# Patient Record
Sex: FEMALE | Race: WHITE | URBAN - METROPOLITAN AREA
[De-identification: names, ages, dates, MRNs, and addresses within clinical notes are randomized per-mention and may not be internally consistent; named-entity substitution may affect disease eponyms.]

---

## 2017-01-12 ENCOUNTER — OFFICE VISIT (OUTPATIENT)
Dept: DERMATOLOGY | Facility: CLINIC | Age: 57
End: 2017-01-12

## 2017-01-12 DIAGNOSIS — C44.729 SQUAMOUS CELL CARCINOMA OF SKIN OF LEFT LOWER EXTREMITY: Primary | ICD-10-CM

## 2017-01-12 PROCEDURE — 88305 TISSUE EXAM BY PATHOLOGIST: CPT | Performed by: DERMATOLOGY

## 2017-01-12 RX ORDER — LIDOCAINE HYDROCHLORIDE AND EPINEPHRINE 10; 10 MG/ML; UG/ML
3 INJECTION, SOLUTION INFILTRATION; PERINEURAL ONCE
Qty: 3 ML | Refills: 0 | OUTPATIENT
Start: 2017-01-12 | End: 2017-01-12

## 2017-01-12 ASSESSMENT — PAIN SCALES - GENERAL
PAINLEVEL: SEVERE PAIN (6)
PAINLEVEL: NO PAIN (0)

## 2017-01-12 NOTE — PROGRESS NOTES
DERMATOLOGIC SURGERY REPORT    NAME OF PROCEDURE:  EXCISION AND INTERMEDIATE CLOSURE    Surgeon:  Alondra Caceres    PREOPERATIVE DIAGNOSIS:  Squamous cell carcinoma  POSTOPERATIVE DIAGNOSIS: Same  FINAL EXCISION SIZE:  2.2 cm x 2.2 cm (including 4 mm margin)  FINAL REPAIR LENGTH:  5.4 cm    INDICATIONS:  This patient presented with a 1.4 cm x 1.4 cm SCC on the L lower leg.  Excision was indicated.  We discussed the principles of treatment and most likely complications including bleeding, infection, wound dehiscence, pain, nerve damage, and scarring.  Informed consent was obtained and the patient underwent the procedure as follows.    PROCEDURE:  The patient was taken to the operative suite.  The treatment area was anesthetized with 1% lidocaine and epinephrine mixed 1:1,000 with 0.5% Marcaine.  The area was washed with Hibiclens, rinsed with saline and draped with sterile towels.  The lesion was delineated and excised down to subcutaneous fat.  Hemostasis was obtained by electrocoagulation.  With a margin of 4 mm, the final excision size was 2.2 cm x 2.2 cm.      REPAIR:  In order to repair this defect while maintaining the normal anatomic relations and function, we elected to utilize an intermediate linear closure.  Closure was oriented so that the wound was in the patient's natural skin tension lines.  Deeper layers of the subcutaneous tissue were undermined first.  Deep dermal and subcutaneous layer closure was performed using 4-0 Vicryl deep, intradermal and subcutaneous sutures.  Two redundant columns were removed by triangulation.  Final cutaneous approximation was achieved with 4-0 Prolene simple running sutures.     The final wound length was 5.4 cm.  A total of 10 ml of anesthesia was administered for all surgical sites.  Estimated blood loss was less than 1 ml for all surgical sites.  A sterile pressure dressing was applied and wound care instructions, with a written handout, were given.  The patient was  discharged from the [facility] alert and ambulatory.

## 2017-01-12 NOTE — Clinical Note
1/12/2017       RE: Nanci Van  [address] [facility] [address]     Dear Colleague,    Thank you for referring your patient, Nanci Van, to the [facility] DERMATOLOGY at [facility]. Please see a copy of my visit note below.    DERMATOLOGIC SURGERY REPORT    NAME OF PROCEDURE:  EXCISION AND INTERMEDIATE CLOSURE    Surgeon:  Alondra Caceres    PREOPERATIVE DIAGNOSIS:  Squamous cell carcinoma  POSTOPERATIVE DIAGNOSIS: Same  FINAL EXCISION SIZE:  5.4 cm x 2.8 cm, with 4 mm margin  FINAL REPAIR LENGTH:  5.4 cm    INDICATIONS:  This patient presented with a 2.0 cm x 2.0 cm SCC on the L lower leg.  Excision was indicated.  We discussed the principles of treatment and most likely complications including bleeding, infection, wound dehiscence, pain, nerve damage, and scarring.  Informed consent was obtained and the patient underwent the procedure as follows.    PROCEDURE:  The patient was taken to the operative suite.  The treatment area was anesthetized with 1% lidocaine and epinephrine mixed 1:1,000 with 0.5% Marcaine.  The area was washed with Hibiclens, rinsed with saline and draped with sterile towels.  The lesion was delineated and excised down to subcutaneous fat.  Hemostasis was obtained by electrocoagulation.  With a margin of 4 mm, the final excision size was 2.8 cm x 5.4 cm.      REPAIR:  In order to repair this defect while maintaining the normal anatomic relations and function, we elected to utilize an intermediate linear closure.  Closure was oriented so that the wound was in the patient's natural skin tension lines.  Deeper layers of the subcutaneous tissue were undermined first.  Deep dermal and subcutaneous layer closure was performed using 4-0 Vicryl deep, intradermal and subcutaneous sutures.  Two redundant columns were removed by triangulation.  Final cutaneous approximation was achieved with 4-0 Prolene simple running sutures.     The final wound  length was 5.4 cm.  A total of 10 ml of anesthesia was administered for all surgical sites.  Estimated blood loss was less than 1 ml for all surgical sites.  A sterile pressure dressing was applied and wound care instructions, with a written handout, were given.  The patient was discharged from the [facility] alert and ambulatory    Again, thank you for allowing me to participate in the care of your patient.      Sincerely,    Kit Caceres MD

## 2017-01-12 NOTE — NURSING NOTE
Dermatology Rooming Note    Nanci Van's goals for this visit include:   Chief Complaint   Patient presents with     Derm Problem     Nanci comes to clinic for excision of SCC.     Vashti Lewis LPN

## 2017-01-12 NOTE — MR AVS SNAPSHOT
After Visit Summary   1/12/2017    Nanci Van    MRN: [id number]           Patient Information     Date Of Birth          1960        Visit Information        Provider Department      1/12/2017 8:15 AM Kit Caceres MD [facility] Dermatology        Today's Diagnoses     Squamous cell carcinoma of skin of left lower extremity    -  1       Care Instructions    Excision Wound Care Instructions  I will experience scar, altered skin color, bleeding, swelling, pain, crusting and redness. I may experience altered sensation. Risks are excessive bleeding, infection, muscle weakness, thick (hypertrophic or keloidal) scar, and recurrence,. A second procedure may be recommended to obtain the best cosmetic or functional result.  Possible complications of any surgical procedure are bleeding, infection, scarring, alteration in skin color and sensation, muscle weakness in the area, wound dehiscence or seperation, or recurrence of the lesion or disease. On occasion, after healing, a secondary procedure or revision may be recommended in order to obtain the best cosmetic or functional result.   After your surgery, a pressure bandage will be placed over the area that has sutures. This will help prevent bleeding. Please follow these instructions until you come back to clinic for suture removal in 2 weeks, as they will help you to prevent complications as your wound heals.  For the First 48 hours After Surgery:  1. Leave the pressure bandage on and keep it dry. If it should come loose, you may retape it, but do not take it off.  2. Relax and take it easy. Do not do any vigorous exercise, heavy lifting, or bending forward. This could cause the wound to bleed.  3. Post-operative pain is usually mild. You may take plain or extra strength Tylenol every 4 hours as needed (do not take more than 4,000mg in one day). Do not take any medicine that contains aspirin, ibuprofen or motrin unless you have been recommended  these by a doctor.  Avoid alcohol and vitamin E as these may increase your tendency to bleed.  4. You may put an ice pack around the bandaged area for 20 minutes every 2-3 hours. This may help reduce swelling, bruising, and pain. Make sure the ice pack is waterproof so that the pressure bandage does not get wet.   5. You may see a small amount of drainage or blood on your pressure bandage. This is normal. However, if drainage or bleeding continues or saturates the bandage, you will need to apply firm pressure over the bandage with a washcloth for 15 minutes. If bleeding continues after applying pressure for 15 minutes then go to the nearest emergency room.  48 Hours After Surgery  Carefully remove the bandage and start daily wound care and dressing changes. You may also now shower and get the wound wet. Wash wound with a mild soap and water.  Use caution when washing the wound. Be gentle and do not let the forceful shower stream hit the wound directly.  PAT dry.  Daily Wound Care:  1. Wash wound with a mild soap and water.  Use caution when washing the wound, be gentle and do not let the forceful shower stream hit the wound directly.  2. PAT DRY.  3. Apply Vaseline (from a new container or tube) over the suture line with a Q-tip. It is very important to keep the wound continuously moist, as wounds heal best in a moist environment.  4.  Keep the site covered until sutures are removed, you can cover it with a Telfa (non-stick) dressing and tape or a band-aid.    5. If you are unable to keep wound covered, you must apply Vaseline every 2 - 3 hours (while awake) to ensure it is being kept moist for optimal healing. A dressing overnight is recommended to keep the area moist.   Call Us If:  1. You have pain that is not controlled with Tylenol.  2. You have signs or symptoms of an infection, such as: fever over 100 degrees F, redness, warmth, or foul-smelling or yellow/creamy drainage from the wound.  Who should I call  with questions?    [facility]: [phone]     [facility]: [phone]    For urgent needs outside of business hours call the [facility] at [phone] and ask for the dermatology resident on call          Follow-ups after your visit        Future tests that were ordered for you today     Open Future Orders        Priority Expected Expires Ordered    Surgical pathology exam Routine  2018            Who to contact     Please call your clinic at [phone] to:    Ask questions about your health    Make or cancel appointments    Discuss your medicines    Learn about your test results    Speak to your doctor   If you have compliments or concerns about an experience at your clinic, or if you wish to file a complaint, please contact [facility] Patient Relations at [phone] or email us at [email or web address]         Additional Information About Your Visit        WatchGuardharSAW Instrument Information     CoursePeert is an electronic gateway that provides easy, online access to your medical records. With SolAeroMed, you can request a clinic appointment, read your test results, renew a prescription or communicate with your care team.     To sign up for CoursePeert visit the website at [email or web address]   You will be asked to enter the access code listed below, as well as some personal information. Please follow the directions to create your username and password.     Your access code is: [email or web address]  Expires: 2/15/2017 11:38 AM     Your access code will  in 90 days. If you need help or a new code, please contact your [facility] or call [phone] for assistance.        Care EveryWhere ID     This is your Care EveryWhere ID. This could be used by other organizations to access your [facility] medical records  [id number]         Blood Pressure from Last 3 Encounters:    06/15/16 170/81   08/07/14 136/76   01/24/14 111/71    Weight from Last 3 Encounters:   11/29/12 77.565 kg (171 lb)   01/12/10 76.885 kg (169 lb 8 oz)   10/20/08 78.926 kg (174 lb)               Primary Care Provider Office Phone # Fax #    Keturah Mckeon [phone]       [facility] [address]  [facility] [place] [address]        Thank you!     Thank you for choosing [facility] DERMATOLOGY  for your care. Our goal is always to provide you with excellent care. Hearing back from our patients is one way we can continue to improve our services. Please take a few minutes to complete the written survey that you may receive in the mail after your visit with us. Thank you!             Your Updated Medication List - Protect others around you: Learn how to safely use, store and throw away your medicines at www.disposemymeds.org.          This list is accurate as of: 1/12/17  9:30 AM.  Always use your most recent med list.                   Brand Name Dispense Instructions for use    ADDERALL PO      Take 40 mg by mouth daily       B COMPLEX-C-FOLIC ACID PO          calcitRIOL 0.25 MCG capsule    ROCALTROL     Take 0.25 mcg by mouth daily       EFFEXOR XR PO      Take  by mouth. 300 mg       furosemide 20 MG tablet    LASIX     Take 20 mg by mouth daily Take 20 mg twice daily       HYDROcodone-acetaminophen 5-325 MG per tablet    NORCO    28 tablet    Take 1 tablet by mouth every 6 hours as needed for moderate to severe pain       insulin detemir 100 UNIT/ML injection    LEVEMIR     Inject Subcutaneous At Bedtime       liraglutide 18 MG/3ML soln    VICTOZA     Inject Subcutaneous daily       mupirocin 2 % ointment    BACTROBAN    22 g    Use 1 to 2 times a day to affected area.       predniSONE 5 MG tablet    DELTASONE    0    1 TABLET DAILY       PROTONIX 40 MG EC tablet   Generic drug:  pantoprazole     30    1 TABLET DAILY       PROZAC PO      Take 40 mg by mouth       SIMVASTATIN PO           sirolimus Tabs tablet     30 tablet    Take 1 tablet by mouth daily.       TEMAZEPAM PO      15 mg.       traZODone 50 MG tablet    DESYREL    60    1-2 pills at night as needed       UNKNOWN MED DOSAGE      A renal vitamin       VISTARIL PO          VITAMIN D PO

## 2017-01-12 NOTE — PATIENT INSTRUCTIONS

## 2017-01-16 LAB — COPATH REPORT: NORMAL

## 2017-01-18 ENCOUNTER — TELEPHONE (OUTPATIENT)
Dept: DERMATOLOGY | Facility: CLINIC | Age: 57
End: 2017-01-18

## 2017-01-18 NOTE — TELEPHONE ENCOUNTER
Called patient with results of excision.  Skin cancer completely removed.  Patient notes she is healing well but having a lot of tugging sensation at the stitches.  I discussed with her that this is a sign that she is doing too much. We reviewed that the lower leg is very slow to heal and requires special care. Explained that she should keep the leg elevated as much as possible to prevent swelling.  She asked if she could have stitches removed closer to home.  I told her this is fine, but should wait for between 2-3 weeks after surgery prior to removal.      Rima Poole MD  PGY-3, Dermatology  Pager [phone]

## 2017-01-24 ENCOUNTER — TELEPHONE (OUTPATIENT)
Dept: DERMATOLOGY | Facility: CLINIC | Age: 57
End: 2017-01-24

## 2017-01-24 NOTE — TELEPHONE ENCOUNTER
"Returned patients call regarding possible infection at suture site on lower left leg. Patient states area is \"more red\" than previous. Sutures intact. Pain 3/10. Denies fever, swelling or drainage. States she noted possible odor from the site. Patient has been using \"golbs\" of neosporin to the site and covering with bandage. Advised patient to d/c neosporin as it can cause unwanted irritation/reaction. Advised applying thin layer of vaseline and cover as previously instructed. Patient was planning on removing sutures herself but would now like to be seen to have them removed. Patient scheduled on 1/26/17 at 7:45 with Dr Caceres. Patient to contact clinic if are worsens before appt.    "

## 2017-01-26 ENCOUNTER — OFFICE VISIT (OUTPATIENT)
Dept: DERMATOLOGY | Facility: CLINIC | Age: 57
End: 2017-01-26

## 2017-01-26 DIAGNOSIS — Z85.828 HISTORY OF SQUAMOUS CELL CARCINOMA OF SKIN: ICD-10-CM

## 2017-01-26 PROCEDURE — 87077 CULTURE AEROBIC IDENTIFY: CPT | Performed by: DERMATOLOGY

## 2017-01-26 PROCEDURE — 87070 CULTURE OTHR SPECIMN AEROBIC: CPT | Performed by: DERMATOLOGY

## 2017-01-26 PROCEDURE — 87186 SC STD MICRODIL/AGAR DIL: CPT | Performed by: DERMATOLOGY

## 2017-01-26 RX ORDER — CEPHALEXIN 500 MG/1
500 CAPSULE ORAL 2 TIMES DAILY
Qty: 20 CAPSULE | Refills: 0 | Status: SHIPPED | OUTPATIENT
Start: 2017-01-26 | End: 2017-02-13

## 2017-01-26 ASSESSMENT — PAIN SCALES - GENERAL: PAINLEVEL: SEVERE PAIN (6)

## 2017-01-26 NOTE — MR AVS SNAPSHOT
After Visit Summary   1/26/2017    Nanci Van    MRN: [id number]           Patient Information     Date Of Birth          1960        Visit Information        Provider Department      1/26/2017 7:45 AM Kit Caceres MD [facility] Dermatology        Today's Diagnoses     Postoperative wound infection, initial encounter    -  1     History of squamous cell carcinoma of skin            Follow-ups after your visit        Your next 10 appointments already scheduled     Jan 26, 2017  7:45 AM   (Arrive by 7:30 AM)   Return Visit with Kit Caceres MD   [facility] Dermatology [facility])    [address] [place] [id number] [phone]            Feb 02, 2017 11:15 AM   Nurse Visit with  Dermatology Nurse   [facility])    [address] [place] [id number] [phone]            Feb 23, 2017  9:30 AM   (Arrive by 9:15 AM)   Return Visit with Kit Caceres MD   [facility] Dermatology ([facility])    [address] [place] [id number] [phone]              Future tests that were ordered for you today     Open Future Orders        Priority Expected Expires Ordered    Skin Culture Aerobic Bacterial Routine  1/26/2018 1/26/2017            Who to contact     Please call your clinic at [phone] to:    Ask questions about your health    Make or cancel appointments    Discuss your medicines    Learn about your test results    Speak to your doctor   If you have compliments or concerns about an experience at your clinic, or if you wish to file a complaint, please contact [facility] Patient Relations at [phone] or email us at [email or web address]         Additional Information About Your Visit        Care EveryWhere ID     This is your Care EveryWhere ID. This could be used by  other organizations to access your [facility] medical records  [id number]         Blood Pressure from Last 3 Encounters:   06/15/16 170/81   08/07/14 136/76   01/24/14 111/71    Weight from Last 3 Encounters:   11/29/12 77.565 kg (171 lb)   01/12/10 76.885 kg (169 lb 8 oz)   10/20/08 78.926 kg (174 lb)                 Today's Medication Changes          These changes are accurate as of: 1/26/17  7:39 AM.  If you have any questions, ask your nurse or doctor.               Start taking these medicines.        Dose/Directions    cephALEXin 500 MG capsule   Commonly known as:  KEFLEX   Used for:  History of squamous cell carcinoma of skin   Started by:  Kit Caceres MD        Dose:  500 mg   Take 1 capsule (500 mg) by mouth 2 times daily   Quantity:  20 capsule   Refills:  0            Where to get your medicines      These medications were sent to [address] [address]    Hours:  TRANSPLANT PHONE NUMBER [phone] Phone:  [phone]    - cephALEXin 500 MG capsule             Primary Care Provider Office Phone # Fax #    Keturah Mckeon [phone]       [facility] [address]  [facility] [address]        Thank you!     Thank you for choosing [facility] DERMATOLOGY  for your care. Our goal is always to provide you with excellent care. Hearing back from our patients is one way we can continue to improve our services. Please take a few minutes to complete the written survey that you may receive in the mail after your visit with us. Thank you!             Your Updated Medication List - Protect others around you: Learn how to safely use, store and throw away your medicines at www.disposemymeds.org.          This list is accurate as of: 1/26/17  7:39 AM.  Always use your most recent med list.                   Brand Name Dispense Instructions for use    ADDERALL PO       Take 40 mg by mouth daily       B COMPLEX-C-FOLIC ACID PO          calcitRIOL 0.25 MCG capsule    ROCALTROL     Take 0.25 mcg by mouth daily       cephALEXin 500 MG capsule    KEFLEX    20 capsule    Take 1 capsule (500 mg) by mouth 2 times daily       EFFEXOR XR PO      Take  by mouth. 300 mg       furosemide 20 MG tablet    LASIX     Take 20 mg by mouth daily Take 20 mg twice daily       HYDROcodone-acetaminophen 5-325 MG per tablet    NORCO    28 tablet    Take 1 tablet by mouth every 6 hours as needed for moderate to severe pain       insulin detemir 100 UNIT/ML injection    LEVEMIR     Inject Subcutaneous At Bedtime       liraglutide 18 MG/3ML soln    VICTOZA     Inject Subcutaneous daily       mupirocin 2 % ointment    BACTROBAN    22 g    Use 1 to 2 times a day to affected area.       predniSONE 5 MG tablet    DELTASONE    0    1 TABLET DAILY       PROTONIX 40 MG EC tablet   Generic drug:  pantoprazole     30    1 TABLET DAILY       PROZAC PO      Take 40 mg by mouth       SIMVASTATIN PO          sirolimus Tabs tablet     30 tablet    Take 1 tablet by mouth daily.       TEMAZEPAM PO      15 mg.       traZODone 50 MG tablet    DESYREL    60    1-2 pills at night as needed       UNKNOWN MED DOSAGE      A renal vitamin       VISTARIL PO          VITAMIN D PO

## 2017-01-26 NOTE — NURSING NOTE
Dermatology Rooming Note    Nanci Van's goals for this visit include:   Chief Complaint   Patient presents with     Derm Problem     Possible infection of excision site.     Noy Mckeon, CMA

## 2017-01-26 NOTE — Clinical Note
1/26/2017       RE: Nanci Van  [address]   [facility] [address]     Dear Colleague,    Thank you for referring your patient, Nanci Van, to the [facility] DERMATOLOGY at [facility]. Please see a copy of my visit note below.    SUBJECTIVE:  56F returning for 2 week follow-up, reporting redness and pain at surgery site for SCC 2 weeks ago.     OBJECTIVE:  A healthy lady in no distress.  Present on her left lower leg is a red-rimmed ~5cm linear wound with friable edges, intact running and interrupted prolene sutures, mild tenderness, minimal inducible purlence.     MEDICATIONS:  Reviewed.      ALLERGIES:  Reviewed.     ASSESSMENT:  Postoperative woudn infection     PLAN:   -empiric RX cephalexin 500 mg po BID, which she has tolerated well in the past. Side effects reviewed.  -swab for bacterial culture will call with results, plan for abx transition    RTC 1 weeks (nurse visit for suture removal)    Discussed and examined with [facility] staff dermatologist Dr. JAMES Caceres MD, JACINTA  PGY-4, Dermatology

## 2017-01-28 ENCOUNTER — TELEPHONE (OUTPATIENT)
Dept: NEPHROLOGY | Facility: CLINIC | Age: 57
End: 2017-01-28

## 2017-01-28 LAB
BACTERIA SPEC CULT: ABNORMAL
Lab: ABNORMAL
MICRO REPORT STATUS: ABNORMAL
MICROORGANISM SPEC CULT: ABNORMAL
SPECIMEN SOURCE: ABNORMAL

## 2017-01-28 NOTE — TELEPHONE ENCOUNTER
Medication Appeal Initiation    We have initiated an appeal for the requested medication:  Medication: RAPAMUNE 1 MG tablet   Appeal Start Date:  1/28/2017  Insurance Company:  [employer] PHONE#[phone]  Comments:   MANUALLY FAXED APPEAL TO FAX#[phone]    TRANSPLANT DATE:11/02/1987  MEDICARE PART-A EFFECTIVE DATE:11/01/2014

## 2017-01-28 NOTE — TELEPHONE ENCOUNTER
[facility] Prior Authorization Team   Phone: [phone]  Fax: [phone]      PA Initiation    Medication: RAPAMUNE 1 MG tablet   Insurance Company: Carol - Phone [phone] Fax [phone]  Pharmacy Filling the Rx: [facility] - [place], [place] - [address]  Filling Pharmacy Phone: [phone]  Filling Pharmacy Fax: [phone]  Start Date: 1/28/2017

## 2017-01-28 NOTE — TELEPHONE ENCOUNTER
PRIOR AUTHORIZATION DENIED    Medication: RAPAMUNE 1 MG tablet     Denial Date: 1/28/2017    Denial Rational: [employer] STATES COVERAGE IS AVAILABLE UNDER PART-B HOWEVER [employer] PART-D HAS PREVIOUSLY APPROVED COVERAGE - FOR PEER TO PEER REVIEW OF ADVERSE DETERMINATION - CONTACT COVERAGE DETERMINATION CENTER PHONE#[phone]     Appeal Information: SUBMIT APPEAL VIA FAX#[phone] OR PHONE#[phone]

## 2017-01-29 ENCOUNTER — TELEPHONE (OUTPATIENT)
Dept: DERMATOLOGY | Facility: CLINIC | Age: 57
End: 2017-01-29

## 2017-01-29 NOTE — TELEPHONE ENCOUNTER
Reached Ms giordano by phone with Cx result showing MSSA. Advised continuing cephalexin as planned, follow-up as planned.    Kit Caceres MD, JACINTA  PGY-4, Dermatology

## 2017-02-02 ENCOUNTER — ALLIED HEALTH/NURSE VISIT (OUTPATIENT)
Dept: DERMATOLOGY | Facility: CLINIC | Age: 57
End: 2017-02-02

## 2017-02-02 DIAGNOSIS — Z48.02 VISIT FOR SUTURE REMOVAL: Primary | ICD-10-CM

## 2017-02-02 RX ORDER — LIDOCAINE HYDROCHLORIDE AND EPINEPHRINE 10; 10 MG/ML; UG/ML
3 INJECTION, SOLUTION INFILTRATION; PERINEURAL ONCE
Qty: 3 ML | Refills: 0 | OUTPATIENT
Start: 2017-02-02 | End: 2017-02-02

## 2017-02-02 NOTE — NURSING NOTE
Removed running sutures, from leg without complication.  Patient tolerated procedure well, and understood all followup instructions.

## 2017-02-09 NOTE — PROGRESS NOTES
I talked with and examined Nanci Van and I agree with the assessment and the plan. ROSINA Cantu MD.

## 2017-02-13 ENCOUNTER — TELEPHONE (OUTPATIENT)
Dept: DERMATOLOGY | Facility: CLINIC | Age: 57
End: 2017-02-13

## 2017-02-13 DIAGNOSIS — Z85.828 HISTORY OF SQUAMOUS CELL CARCINOMA OF SKIN: ICD-10-CM

## 2017-02-13 DIAGNOSIS — T81.49XA WOUND INFECTION FOLLOWING PROCEDURE: Primary | ICD-10-CM

## 2017-02-13 RX ORDER — CEPHALEXIN 500 MG/1
500 CAPSULE ORAL 2 TIMES DAILY
Qty: 20 CAPSULE | Refills: 0 | Status: SHIPPED | OUTPATIENT
Start: 2017-02-13 | End: 2017-05-23

## 2017-02-13 NOTE — TELEPHONE ENCOUNTER
Received refill request for Keflex as the resident on call. Reviewed patient's chart and attached communication. Patient last seen 1/27/17 for post-op wound infection following Mohs on the leg for SCC. Culture grew MSSA; the infection responded mostly but not completely to the Keflex which she tolerated well; 1 week after finishing the course, she started having redness, green drainage, and pain again. After reviewing the assessment and plan from last visit, and discussing with Dr. Driver, dermatologist on call, the refill request was accepted. She has f/u with Dr. Caceres next week.      Martell Ledezma MD  Dermatology Resident

## 2017-02-13 NOTE — TELEPHONE ENCOUNTER
Patient states her home care nurse states she needed to contact clinic due to the look of her wound.    Patient reports Pain 5/10, weeping green/gray drainage, possible odor, warmth, redness, denies fever. Patient states she is a kidney patient and was told by Dr Caceres to call if she felt she had another infection and antibiotic would be ordered.    Pt uses [facility] [address] [place] [address] [phone]

## 2017-03-30 ENCOUNTER — OFFICE VISIT (OUTPATIENT)
Dept: DERMATOLOGY | Facility: CLINIC | Age: 57
End: 2017-03-30

## 2017-03-30 DIAGNOSIS — Z85.828 PERSONAL HISTORY OF SQUAMOUS CELL CARCINOMA OF SKIN: ICD-10-CM

## 2017-03-30 DIAGNOSIS — L57.0 AK (ACTINIC KERATOSIS): ICD-10-CM

## 2017-03-30 DIAGNOSIS — L85.3 XEROSIS CUTIS: ICD-10-CM

## 2017-03-30 DIAGNOSIS — D48.5 NEOPLASM OF UNCERTAIN BEHAVIOR OF SKIN: Primary | ICD-10-CM

## 2017-03-30 DIAGNOSIS — L82.1 STUCCO KERATOSES: ICD-10-CM

## 2017-03-30 PROCEDURE — 88305 TISSUE EXAM BY PATHOLOGIST: CPT | Performed by: DERMATOLOGY

## 2017-03-30 RX ORDER — AMMONIUM LACTATE 12 G/100G
LOTION TOPICAL 2 TIMES DAILY
Qty: 500 G | Refills: 11 | Status: SHIPPED | OUTPATIENT
Start: 2017-03-30

## 2017-03-30 ASSESSMENT — PAIN SCALES - GENERAL
PAINLEVEL: MODERATE PAIN (4)
PAINLEVEL: NO PAIN (0)

## 2017-03-30 NOTE — MR AVS SNAPSHOT
After Visit Summary   3/30/2017    Nanci Van    MRN: [id number]           Patient Information     Date Of Birth          1960        Visit Information        Provider Department      3/30/2017 9:30 AM Kit Caceres MD [facility] Dermatology        Today's Diagnoses     Neoplasm of uncertain behavior of skin    -  1    AK (actinic keratosis)        Personal history of squamous cell carcinoma of skin        Stucco keratoses        Xerosis cutis          Care Instructions    Wound Care After a Biopsy    What is a skin biopsy?  A skin biopsy allows the doctor to examine a very small piece of tissue under the microscope to determine the diagnosis and the best treatment for the skin condition. A local anesthetic (numbing medicine)  is injected with a very small needle into the skin area to be tested. A small piece of skin is taken from the area. Sometimes a suture (stitch) is used.     What are the risks of a skin biopsy?  I will experience scar, bleeding, swelling, pain, crusting and redness. I may experience incomplete removal or recurrence. Risks of this procedure are excessive bleeding, bruising, infection, nerve damage, numbness, thick (hypertrophic or keloidal) scar and non-diagnostic biopsy.    How should I care for my wound for the first 24 hours?    Keep the wound dry and covered for 24 hours    If it bleeds, hold direct pressure on the area for 15 minutes. If bleeding does not stop then go to the emergency room    Avoid strenuous exercise the first 1-2 days or as your doctor instructs you    How should I care for the wound after 24 hours?    After 24 hours, remove the bandage    You may bathe or shower as normal    If you had a scalp biopsy, you can shampoo as usual and can use shower water to clean the biopsy site daily    Clean the wound twice a day with gentle soap and water    Do not scrub, be gentle    Apply white petroleum/Vaseline after cleaning the wound with a cotton swab or  a clean finger, and keep the site covered with a Bandaid /bandage. Bandages are not necessary with a scalp biopsy    If you are unable to cover the site with a Bandaid /bandage, re-apply ointment 2-3 times a day to keep the site moist. Moisture will help with healing    Avoid strenuous activity for first 1-2 days    Avoid lakes, rivers, pools, and oceans until the stitches are removed or the site is healed    How do I clean my wound?    Wash hands for 15 minutes with soap or use hand  before all wound care    Clean the wound with gentle soap and water    Apply white petroleum/Vaseline  to wound after it is clean    Replace the Bandaid /bandage to keep the wound covered for the first few days or as instructed by your doctor    If you had a scalp biopsy, warm shower water to the area on a daily basis should suffice    What should I use to clean my wound?     Cotton-tipped applicators (Qtips )    White petroleum jelly (Vaseline ). Use a clean new container and use Q-tips to apply.    Bandaids   as needed    Gentle soap     How should I care for my wound long term?    Do not get your wound dirty    Keep up with wound care for one week or until the area is healed.    A small scab will form and fall off by itself when the area is completely healed. The area will be red and will become pink in color as it heals. Sun protection is very important for how your scar will turn out. Sunscreen with an SPF 30 or greater is recommended once the area is healed.    If you have stitches, stitches need to be removed in 0     days. You may return to our clinic for this or you may have it done locally at your doctor s office.    You should have some soreness but it should be mild and slowly go away over several days. Talk to your doctor about using tylenol for pain,    When should I call my doctor?  If you have increased:     Pain or swelling    Pus or drainage (clear or slightly yellow drainage is ok)    Temperature over  100F    Spreading redness or warmth around wound    When will I hear about my results?  The biopsy results can take 2-3 weeks to come back. The clinic will call you with the results, send you a [employer] message, or have you schedule a follow-up clinic or phone time to discuss the results. Contact our clinics if you do not hear from us in 3 weeks.     Who should I call with questions?    [facility]: [phone]     [facility]: [phone]    For urgent needs outside of business hours call the [facility] at [phone] and ask for the dermatology resident on call            Follow-ups after your visit        Your next 10 appointments already scheduled     Jun 29, 2017 10:45 AM CDT   (Arrive by 10:30 AM)   Return Visit with Kit Caceres MD   [facility] Dermatology ([facility])    [address] [place] [id number] [phone]              Who to contact     Please call your clinic at [phone] to:    Ask questions about your health    Make or cancel appointments    Discuss your medicines    Learn about your test results    Speak to your doctor   If you have compliments or concerns about an experience at your clinic, or if you wish to file a complaint, please contact [facility] Patient Relations at [phone] or email us at [email or web address]         Additional Information About Your Visit        [employer] Information     [employer] gives you secure access to your electronic health record. If you see a primary care provider, you can also send messages to your care team and make appointments. If you have questions, please call your primary care clinic.  If you do not have a primary care provider, please call [phone] and they will assist you.      [employer] is an electronic gateway that provides easy, online access to your medical records. With [employer],  you can request a clinic appointment, read your test results, renew a prescription or communicate with your care team.     To access your existing account, please contact your [facility] or call [phone] for assistance.        Care EveryWhere ID     This is your Care EveryWhere ID. This could be used by other organizations to access your [facility] medical records  [id number]         Blood Pressure from Last 3 Encounters:   06/15/16 170/81   08/07/14 136/76   01/24/14 111/71    Weight from Last 3 Encounters:   11/29/12 77.6 kg (171 lb)   01/12/10 76.9 kg (169 lb 8 oz)   10/20/08 78.9 kg (174 lb)              We Performed the Following     Surgical pathology exam          Today's Medication Changes          These changes are accurate as of: 3/30/17 10:12 AM.  If you have any questions, ask your nurse or doctor.               Start taking these medicines.        Dose/Directions    ammonium lactate 12 % lotion   Commonly known as:  LAC-HYDRIN   Used for:  Xerosis cutis   Started by:  Kit Caceres MD        Apply topically 2 times daily   Quantity:  500 g   Refills:  11            Where to get your medicines      These medications were sent to [place], [place] - [address] [facility]  [address] [facility] [address]     Phone:  [phone]     ammonium lactate 12 % lotion                Primary Care Provider Office Phone # Fax #    Keturah Mckeon [phone]       [facility] [address]  [facility] [address]        Thank you!     Thank you for choosing [facility] DERMATOLOGY  for your care. Our goal is always to provide you with excellent care. Hearing back from our patients is one way we can continue to improve our services. Please take a few minutes to complete the written survey that you may receive in the mail after your visit with us. Thank you!             Your Updated Medication List - Protect others  around you: Learn how to safely use, store and throw away your medicines at www.disposemymeds.org.          This list is accurate as of: 3/30/17 10:12 AM.  Always use your most recent med list.                   Brand Name Dispense Instructions for use    ADDERALL PO      Take 40 mg by mouth daily       ammonium lactate 12 % lotion    LAC-HYDRIN    500 g    Apply topically 2 times daily       B COMPLEX-C-FOLIC ACID PO          calcitRIOL 0.25 MCG capsule    ROCALTROL     Take 0.25 mcg by mouth daily       cephALEXin 500 MG capsule    KEFLEX    20 capsule    Take 1 capsule (500 mg) by mouth 2 times daily       EFFEXOR XR PO      Take  by mouth. 300 mg       furosemide 20 MG tablet    LASIX     Take 20 mg by mouth daily Take 20 mg twice daily       HYDROcodone-acetaminophen 5-325 MG per tablet    NORCO    28 tablet    Take 1 tablet by mouth every 6 hours as needed for moderate to severe pain       insulin detemir 100 UNIT/ML injection    LEVEMIR     Inject Subcutaneous At Bedtime       liraglutide 18 MG/3ML soln    VICTOZA     Inject Subcutaneous daily       mupirocin 2 % ointment    BACTROBAN    22 g    Use 1 to 2 times a day to affected area.       predniSONE 5 MG tablet    DELTASONE    0    1 TABLET DAILY       PROTONIX 40 MG EC tablet   Generic drug:  pantoprazole     30    1 TABLET DAILY       PROZAC PO      Take 40 mg by mouth       SIMVASTATIN PO          sirolimus Tabs tablet     30 tablet    Take 1 tablet by mouth daily.       TEMAZEPAM PO      15 mg.       traZODone 50 MG tablet    DESYREL    60    1-2 pills at night as needed       UNKNOWN MED DOSAGE      A renal vitamin       VISTARIL PO          VITAMIN D PO

## 2017-03-30 NOTE — PATIENT INSTRUCTIONS
Wound Care After a Biopsy    What is a skin biopsy?  A skin biopsy allows the doctor to examine a very small piece of tissue under the microscope to determine the diagnosis and the best treatment for the skin condition. A local anesthetic (numbing medicine)  is injected with a very small needle into the skin area to be tested. A small piece of skin is taken from the area. Sometimes a suture (stitch) is used.     What are the risks of a skin biopsy?  I will experience scar, bleeding, swelling, pain, crusting and redness. I may experience incomplete removal or recurrence. Risks of this procedure are excessive bleeding, bruising, infection, nerve damage, numbness, thick (hypertrophic or keloidal) scar and non-diagnostic biopsy.    How should I care for my wound for the first 24 hours?    Keep the wound dry and covered for 24 hours    If it bleeds, hold direct pressure on the area for 15 minutes. If bleeding does not stop then go to the emergency room    Avoid strenuous exercise the first 1-2 days or as your doctor instructs you    How should I care for the wound after 24 hours?    After 24 hours, remove the bandage    You may bathe or shower as normal    If you had a scalp biopsy, you can shampoo as usual and can use shower water to clean the biopsy site daily    Clean the wound twice a day with gentle soap and water    Do not scrub, be gentle    Apply white petroleum/Vaseline after cleaning the wound with a cotton swab or a clean finger, and keep the site covered with a Bandaid /bandage. Bandages are not necessary with a scalp biopsy    If you are unable to cover the site with a Bandaid /bandage, re-apply ointment 2-3 times a day to keep the site moist. Moisture will help with healing    Avoid strenuous activity for first 1-2 days    Avoid lakes, rivers, pools, and oceans until the stitches are removed or the site is healed    How do I clean my wound?    Wash hands for 15 minutes with soap or use hand  before  all wound care    Clean the wound with gentle soap and water    Apply white petroleum/Vaseline  to wound after it is clean    Replace the Bandaid /bandage to keep the wound covered for the first few days or as instructed by your doctor    If you had a scalp biopsy, warm shower water to the area on a daily basis should suffice    What should I use to clean my wound?     Cotton-tipped applicators (Qtips )    White petroleum jelly (Vaseline ). Use a clean new container and use Q-tips to apply.    Bandaids   as needed    Gentle soap     How should I care for my wound long term?    Do not get your wound dirty    Keep up with wound care for one week or until the area is healed.    A small scab will form and fall off by itself when the area is completely healed. The area will be red and will become pink in color as it heals. Sun protection is very important for how your scar will turn out. Sunscreen with an SPF 30 or greater is recommended once the area is healed.    If you have stitches, stitches need to be removed in 0     days. You may return to our clinic for this or you may have it done locally at your doctor s office.    You should have some soreness but it should be mild and slowly go away over several days. Talk to your doctor about using tylenol for pain,    When should I call my doctor?  If you have increased:     Pain or swelling    Pus or drainage (clear or slightly yellow drainage is ok)    Temperature over 100F    Spreading redness or warmth around wound    When will I hear about my results?  The biopsy results can take 2-3 weeks to come back. The clinic will call you with the results, send you a [employer] message, or have you schedule a follow-up clinic or phone time to discuss the results. Contact our clinics if you do not hear from us in 3 weeks.     Who should I call with questions?    [facility]: [phone]     [facility]:  [phone]    For urgent needs outside of business hours call the [facility] at [phone] and ask for the dermatology resident on call    Cryotherapy    What is it?    Use of a very cold liquid, such as liquid nitrogen, to freeze and destroy abnormal skin cells that need to be removed    What should I expect?    Tenderness and redness    A small blister that might grow and fill with dark purple blood. There may be crusting.    More than one treatment may be needed if the lesions do not go away.    How do I care for the treated area?    Gently wash the area with your hands when bathing.    Use a thin layer of Vaseline to help with healing. You may use a Band-Aid.     The area should heal within 7-10 days and may leave behind a pink or lighter color.     Do not use an antibiotic or Neosporin ointment.     You may take acetaminophen (Tylenol) for pain.     Call your Doctor if you have:    Severe pain    Signs of infection (warmth, redness, cloudy yellow drainage, and or a bad smell)    Questions or concerns    Who should I call with questions?       [facility]: [phone]       [facility]: [phone]       For urgent needs outside of business hours call the [facility] at [phone]        and ask for the dermatology resident on call

## 2017-03-30 NOTE — NURSING NOTE
Dermatology Rooming Note    Nanci Van's goals for this visit include:   Chief Complaint   Patient presents with     Derm Problem     Patient comes to clinic today for a TSC. States concern with her hands and face.     Joana Ramirez, CMA

## 2017-03-30 NOTE — LETTER
3/30/2017       RE: Nanci Van  [address]   [facility] [address]     Dear Colleague,    Thank you for referring your patient, Nanci Van, to the [facility] DERMATOLOGY at [facility]. Please see a copy of my visit note below.                Pictures taken of patient today to be placed in chart for future reference.      [facility] Dermatology Note      Dermatology Problem List:  1. Multiple SCCs  2. Hx Renal transplantation in 1987    Encounter Date: Mar 30, 2017    CC:   Chief Complaint   Patient presents with     Derm Problem     Patient comes to clinic today for a TSC. States concern with her hands and face.       History of Present Illness:  Ms. Nanci Van is a 56-year-old woman with a history of renal transplantation in 1987.  She has had multiple squamous cell carcinomas in the past related to his immunosuppression.  She was last seen by me approximately 2 months ago following excision of a SCC on her left lower leg. At this time, she complains of multiple tender lesions on her L medial hand and R cheek    Past Medical History:   Patient Active Problem List   Diagnosis     Mixed hyperlipidemia     Kidney replaced by transplant     Essential hypertension     Other osteoporosis     Disorder of kidney and ureter     Esophageal reflux     Attention deficit disorder     Squamous cell carcinoma (H)     Past Medical History:   Diagnosis Date     Abnormal glandular Papanicolaou smear of cervix      Esophageal reflux      Essential hypertension, benign      Kidney replaced by transplant 1987    f/b renal     Mitral valve disorders 2005    echo done, she needs SBE prophylaxis     Mixed hyperlipidemia      Other osteoporosis      Postmenopausal atrophic vaginitis     post-men.     Pyelonephritis, unspecified     had bilat nephrectomy      SQUAMOUS CELL CANCER 9/06    left leg     Squamous cell carcinoma (H)      Urosepsis 12/06     Enterococcal urosepsis, hospitalized     Past Surgical History:   Procedure Laterality Date     BIOPSY OF SKIN LESION       C MAXILLARY SINUSOTOMY      surg twice      C NONSPECIFIC PROCEDURE  2001    cervical surgery x 2 for fractured C4-5     C TOTAL ABDOM HYSTERECTOMY  1996     for hemorrhaging, including BSO     C TRANSPLANT KIDNEY+RECIP NEPHREC  1983, 1987    first one from brother, rejected due to preg, 2nd transplant from cadaver     MOHS MICROGRAPHIC PROCEDURE       SURGICAL HISTORY OF -   9/06    removal squamous cell CA left leg       Social History:      Family History:    Medications:  Current Outpatient Prescriptions   Medication Sig Dispense Refill     ammonium lactate (LAC-HYDRIN) 12 % lotion Apply topically 2 times daily 500 g 11     cephALEXin (KEFLEX) 500 MG capsule Take 1 capsule (500 mg) by mouth 2 times daily 20 capsule 0     mupirocin (BACTROBAN) 2 % ointment Use 1 to 2 times a day to affected area. 22 g 3     liraglutide (VICTOZA) 18 MG/3ML soln Inject Subcutaneous daily       insulin detemir (LEVEMIR) 100 UNIT/ML soln Inject Subcutaneous At Bedtime       HydrOXYzine Pamoate (VISTARIL PO)        Amphetamine-Dextroamphetamine (ADDERALL PO) Take 40 mg by mouth daily       HYDROcodone-acetaminophen (NORCO) 5-325 MG per tablet Take 1 tablet by mouth every 6 hours as needed for moderate to severe pain 28 tablet 0     FLUoxetine HCl (PROZAC PO) Take 40 mg by mouth       SIMVASTATIN PO        Cholecalciferol (VITAMIN D PO)        B COMPLEX-C-FOLIC ACID PO        calcitRIOL (ROCALTROL) 0.25 MCG capsule Take 0.25 mcg by mouth daily       Furosemide (LASIX) 20 MG tablet Take 20 mg by mouth daily Take 20 mg twice daily       RAPAMUNE 1 MG tablet Take 1 tablet by mouth daily. 30 tablet 2     Venlafaxine HCl (EFFEXOR XR PO) Take  by mouth. 300 mg       TEMAZEPAM PO 15 mg.        UNKNOWN MED DOSAGE A renal vitamin       PROTONIX 40 MG OR TBEC 1 TABLET DAILY 30 0     TRAZODONE HCL 50 MG OR TABS 1-2 pills at  "night as needed 60 6     PREDNISONE 5 MG OR TABS 1 TABLET DAILY 0 0        Allergies   Allergen Reactions     Codeine      Erythromycin      \"violently ill\"     Esters      tigan     Fosamax      GI         Review of Systems:  General: No recent infections, fevers, chills, malaise, arthralgias, unexplained weight/appetite changes  Skin: No new/changing moles, nothing bleeding/nonhealing/painful/tender/rapidly-growing.  Lymphatic: No subcutaneous lumps/bumps.      Physical exam:  Vitals: There were no vitals taken for this visit.  GEN: Well-appearing femal, no discomfort or distress, cooperative with the exam  SKIN: Full skin, which includes the head/face, both arms, chest, back, abdomen,both legs, genitalia and/or groin buttocks, digits and/or nails, was examined.  - On her face as well as her hands and extremities, there are well-healed scars with no evidence of recurrent squamous cell carcinomas on her previous surgical sites.    -On her L medial hand and R cheek tender are keratotic plaques suspicious for SCCs.   - on her upper trunk and upper extremities there are multiple variably hyperkeratotic papules c/w actinic keratoses   -No other lesions of concern on areas examined.     Impression/Plan:  1. NUB x 2 (L medial hand, R cheek)  - Shave biopsy:  After discussion of benefits and risks including but not limited to bleeding/bruising, pain/swelling, infection, scar, incomplete removal, nerve damage/numbness, recurrence, and non-diagnostic biopsy, written consent, verbal consent and photographs were obtained. Time-out was performed. The area was cleaned with isopropyl alcohol. 1mL of 1% lidocaine with epinephrine was injected to obtain adequate anesthesia of the lesion on the L medial hand and R cheek . Two shave biopsies was performed. Hemostasis was achieved with aluminium chloride. Vaseline and a sterile dressing were applied. The patient tolerated the procedure and no complications were noted. The patient was " provided with verbal and written post care instructions.     2. History of multiple SCCs  3. Actinic keratosis, upper back, upper extremities  - Cryotherapy procedure note: After verbal consent and discussion of risks and benefits including but no limited to dyspigmentation/scar, blister, and pain, 8 lesions was(were) treated with 1-2mm freeze border for 2 cycles with liquid nitrogen. Post cryotherapy instructions were provided.  - will trial 5-FU after definitive treatment to the L medial hand and R cheek    Follow-up: 3 months.      Discussed and examined with [facility] staff dermatologist Dr. Allison Driver.    Kit Caceres MD, JACINTA  PGY-4, Dermatology      Staff Involved:  Resident(Kit Caceres)/Staff(as above)    I was present for the entire procedures. KB  .I, Allison Driver MD, saw this patient with the resident and agree with the resident s findings and plan of care as documented in the resident s note.      Again, thank you for allowing me to participate in the care of your patient.      Sincerely,    Kit Caceres MD

## 2017-03-30 NOTE — PROGRESS NOTES
[facility] Dermatology Note      Dermatology Problem List:  1. Multiple SCCs  2. Hx Renal transplantation in 1987    Encounter Date: Mar 30, 2017    CC:   Chief Complaint   Patient presents with     Derm Problem     Patient comes to clinic today for a TSC. States concern with her hands and face.       History of Present Illness:  Ms. Nanci Van is a 56-year-old woman with a history of renal transplantation in 1987.  She has had multiple squamous cell carcinomas in the past related to his immunosuppression.  She was last seen by me approximately 2 months ago following excision of a SCC on her left lower leg. At this time, she complains of multiple tender lesions on her L medial hand and R cheek    Past Medical History:   Patient Active Problem List   Diagnosis     Mixed hyperlipidemia     Kidney replaced by transplant     Essential hypertension     Other osteoporosis     Disorder of kidney and ureter     Esophageal reflux     Attention deficit disorder     Squamous cell carcinoma (H)     Past Medical History:   Diagnosis Date     Abnormal glandular Papanicolaou smear of cervix      Esophageal reflux      Essential hypertension, benign      Kidney replaced by transplant 1987    f/b renal     Mitral valve disorders 2005    echo done, she needs SBE prophylaxis     Mixed hyperlipidemia      Other osteoporosis      Postmenopausal atrophic vaginitis     post-men.     Pyelonephritis, unspecified     had bilat nephrectomy      SQUAMOUS CELL CANCER 9/06    left leg     Squamous cell carcinoma (H)      Urosepsis 12/06    Enterococcal urosepsis, hospitalized     Past Surgical History:   Procedure Laterality Date     BIOPSY OF SKIN LESION       C MAXILLARY SINUSOTOMY      surg twice      C NONSPECIFIC PROCEDURE  2001    cervical surgery x 2 for fractured C4-5     C TOTAL ABDOM HYSTERECTOMY  1996     for hemorrhaging, including BSO     C TRANSPLANT KIDNEY+RECIP NEPHREC  1983, 1987    first one from  "brother, rejected due to preg, 2nd transplant from cadaver     MOHS MICROGRAPHIC PROCEDURE       SURGICAL HISTORY OF -   9/06    removal squamous cell CA left leg       Social History:      Family History:    Medications:  Current Outpatient Prescriptions   Medication Sig Dispense Refill     ammonium lactate (LAC-HYDRIN) 12 % lotion Apply topically 2 times daily 500 g 11     cephALEXin (KEFLEX) 500 MG capsule Take 1 capsule (500 mg) by mouth 2 times daily 20 capsule 0     mupirocin (BACTROBAN) 2 % ointment Use 1 to 2 times a day to affected area. 22 g 3     liraglutide (VICTOZA) 18 MG/3ML soln Inject Subcutaneous daily       insulin detemir (LEVEMIR) 100 UNIT/ML soln Inject Subcutaneous At Bedtime       HydrOXYzine Pamoate (VISTARIL PO)        Amphetamine-Dextroamphetamine (ADDERALL PO) Take 40 mg by mouth daily       HYDROcodone-acetaminophen (NORCO) 5-325 MG per tablet Take 1 tablet by mouth every 6 hours as needed for moderate to severe pain 28 tablet 0     FLUoxetine HCl (PROZAC PO) Take 40 mg by mouth       SIMVASTATIN PO        Cholecalciferol (VITAMIN D PO)        B COMPLEX-C-FOLIC ACID PO        calcitRIOL (ROCALTROL) 0.25 MCG capsule Take 0.25 mcg by mouth daily       Furosemide (LASIX) 20 MG tablet Take 20 mg by mouth daily Take 20 mg twice daily       RAPAMUNE 1 MG tablet Take 1 tablet by mouth daily. 30 tablet 2     Venlafaxine HCl (EFFEXOR XR PO) Take  by mouth. 300 mg       TEMAZEPAM PO 15 mg.        UNKNOWN MED DOSAGE A renal vitamin       PROTONIX 40 MG OR TBEC 1 TABLET DAILY 30 0     TRAZODONE HCL 50 MG OR TABS 1-2 pills at night as needed 60 6     PREDNISONE 5 MG OR TABS 1 TABLET DAILY 0 0        Allergies   Allergen Reactions     Codeine      Erythromycin      \"violently ill\"     Esters      tigan     Fosamax      GI         Review of Systems:  General: No recent infections, fevers, chills, malaise, arthralgias, unexplained weight/appetite changes  Skin: No new/changing moles, nothing " bleeding/nonhealing/painful/tender/rapidly-growing.  Lymphatic: No subcutaneous lumps/bumps.      Physical exam:  Vitals: There were no vitals taken for this visit.  GEN: Well-appearing femal, no discomfort or distress, cooperative with the exam  SKIN: Full skin, which includes the head/face, both arms, chest, back, abdomen,both legs, genitalia and/or groin buttocks, digits and/or nails, was examined.  - On her face as well as her hands and extremities, there are well-healed scars with no evidence of recurrent squamous cell carcinomas on her previous surgical sites.    -On her L medial hand and R cheek tender are keratotic plaques suspicious for SCCs.   - on her upper trunk and upper extremities there are multiple variably hyperkeratotic papules c/w actinic keratoses   -No other lesions of concern on areas examined.     Impression/Plan:  1. NUB x 2 (L medial hand, R cheek)  - Shave biopsy:  After discussion of benefits and risks including but not limited to bleeding/bruising, pain/swelling, infection, scar, incomplete removal, nerve damage/numbness, recurrence, and non-diagnostic biopsy, written consent, verbal consent and photographs were obtained. Time-out was performed. The area was cleaned with isopropyl alcohol. 1mL of 1% lidocaine with epinephrine was injected to obtain adequate anesthesia of the lesion on the L medial hand and R cheek . Two shave biopsies was performed. Hemostasis was achieved with aluminium chloride. Vaseline and a sterile dressing were applied. The patient tolerated the procedure and no complications were noted. The patient was provided with verbal and written post care instructions.     2. History of multiple SCCs  3. Actinic keratosis, upper back, upper extremities  - Cryotherapy procedure note: After verbal consent and discussion of risks and benefits including but no limited to dyspigmentation/scar, blister, and pain, 8 lesions was(were) treated with 1-2mm freeze border for 2 cycles  with liquid nitrogen. Post cryotherapy instructions were provided.  - will trial 5-FU after definitive treatment to the L medial hand and R cheek    Follow-up: 3 months.      Discussed and examined with [facility] staff dermatologist Dr. Allison Driver.    Kit Caceres MD, JACINTA  PGY-4, Dermatology      Staff Involved:  Resident(Kit Caceres)/Staff(as above)    I was present for the entire procedures. KB  .I, Allison Driver MD, saw this patient with the resident and agree with the resident s findings and plan of care as documented in the resident s note.

## 2017-04-03 LAB — COPATH REPORT: NORMAL

## 2017-04-11 ENCOUNTER — TELEPHONE (OUTPATIENT)
Dept: DERMATOLOGY | Facility: CLINIC | Age: 57
End: 2017-04-11

## 2017-04-11 DIAGNOSIS — C44.320 SCC (SQUAMOUS CELL CARCINOMA), FACE: Primary | ICD-10-CM

## 2017-04-11 NOTE — TELEPHONE ENCOUNTER
Reached MsNatividad Carlota by phone to discuss bx results:    FINAL DIAGNOSIS:   A.  Skin, cheek, right:   - Squamous cell carcinoma, at least in situ - (see comment)     B.  Skin, shoulder, right:   - Basal cell carcinoma, superficial and nodular types, extending to the   lateral and deep margins - (see description)     C.  Skin, hand, left medial:   - Hypertrophic actinic keratosis - (see comment)       Will plan for exicision of the R shoulder this Thursday, field treatment to the L hand (and arms), and refer for MMS for the cheek. Discussed with Dr. Driver.

## 2017-04-13 ENCOUNTER — OFFICE VISIT (OUTPATIENT)
Dept: DERMATOLOGY | Facility: CLINIC | Age: 57
End: 2017-04-13

## 2017-04-13 DIAGNOSIS — Z85.828 HISTORY OF NONMELANOMA SKIN CANCER: ICD-10-CM

## 2017-04-13 DIAGNOSIS — D48.5 NEOPLASM OF UNCERTAIN BEHAVIOR OF SKIN: ICD-10-CM

## 2017-04-13 DIAGNOSIS — C44.519 BCC (BASAL CELL CARCINOMA), TRUNK: Primary | ICD-10-CM

## 2017-04-13 PROCEDURE — 88305 TISSUE EXAM BY PATHOLOGIST: CPT | Performed by: DERMATOLOGY

## 2017-04-13 RX ORDER — LIDOCAINE HYDROCHLORIDE AND EPINEPHRINE 10; 10 MG/ML; UG/ML
3 INJECTION, SOLUTION INFILTRATION; PERINEURAL ONCE
Qty: 3 ML | Refills: 0 | OUTPATIENT
Start: 2017-04-13 | End: 2017-04-13

## 2017-04-13 RX ORDER — NIACINAMIDE 500 MG
500 TABLET ORAL 2 TIMES DAILY WITH MEALS
Qty: 60 TABLET | Refills: 11 | Status: SHIPPED | OUTPATIENT
Start: 2017-04-13 | End: 2017-04-24

## 2017-04-13 ASSESSMENT — PAIN SCALES - GENERAL
PAINLEVEL: NO PAIN (0)
PAINLEVEL: NO PAIN (0)

## 2017-04-13 NOTE — NURSING NOTE
Dermatology Rooming Note    Nanci Van's goals for this visit include:   Chief Complaint   Patient presents with     Derm Problem     Patient comes to clinic today for excision on right shoulder.     Joana Ramirez CMA

## 2017-04-13 NOTE — NURSING NOTE
48 hour pressure dressing applied-Bactroban ointment, telfa, dental rolls, and tape. Wound care reviewed and supplies given.

## 2017-04-13 NOTE — LETTER
4/13/2017       RE: Nanci Van  [address]   [place] [address]     Dear Colleague,    Thank you for referring your patient, Nanci Van, to the [facility] DERMATOLOG[facility] at [facility]. Please see a copy of my visit note below.            Pictures taken of patient today to be placed in chart for future reference.      DERMATOLOGY PROBLEM LIST  0. NUB, L medial hand, R 4th digit, bx  4/13/17  1. Hx renal transplantation 1987, on sirolimus/prednisone  - niacinamide 500 mg bid  2. NMSC, numerous, especially of her hands  - BCC, R shoulder, excised 4/13/17  - SCC, L lower leg, excised 1/12/17  - SCC, L anterior shin s/p MMS 6/15/16  [id number] (last 3/30/17)    DERMATOLOGIC SURGERY REPORT    NAME OF PROCEDURE:  EXCISION AND INTERMEDIATE CLOSURE    Surgeon:  Zenobia    PREOPERATIVE DIAGNOSIS:   Nodular/superficial basal cell carcinoma (R shoulder)  POSTOPERATIVE DIAGNOSIS: Same  FINAL EXCISION SIZE:  2.0 cm x 5.5 cm, with 4 mm margin  FINAL REPAIR LENGTH:  5.5 cm    INDICATIONS:  This patient presented with a 1.2 cm x 1.2 cm basal cell carcinoma on the R shoulder.  Excision was indicated.  We discussed the principles of treatment and most likely complications including bleeding, infection, wound dehiscence, pain, nerve damage, and scarring.  Informed consent was obtained and the patient underwent the procedure as follows.    PROCEDURE:  The patient was taken to the operative suite.  The treatment area was anesthetized with 1% lidocaine and epinephrine.  The area was washed with Hibiclens, rinsed with saline and draped with sterile towels.  The lesion was delineated and excised down to subcutaneous fat.  Hemostasis was obtained by electrocoagulation.  With a margin of 4 mm, the final excision size was 2.0 cm x 5.5 cm.      REPAIR:  In order to repair this defect while maintaining the normal anatomic relations and function, we elected to utilize an  intermediate linear closure.  Closure was oriented so that the wound was in the patient's natural skin tension lines.  Deeper layers of the subcutaneous tissue were undermined first.  Deep dermal and subcutaneous layer closure was performed using for 4-0 Vicryl deep, intradermal and subcutaneous sutures.  Two redundant columns were removed by triangulation.  Final cutaneous approximation was achieved with 4-0 Prolene simple running sutures.     The final wound length was 4.0 cm.  A total of 6 ml of anesthesia was administered for all surgical sites.  Estimated blood loss was less than 1 ml for all surgical sites.  A sterile pressure dressing was applied and wound care instructions, with a written handout, were given.  [facility] and [facility]ulatory.      BIOPSY PROCEDURE NOTE  After informed written consent was obtained, using alcohol for cleansing and 1% Lidocaine with epinephrine for anesthetic, a shave biopsy was performed using a [place] blade of the R 4th digit lesion, and a punch biopsy was performed on the L medial hand lesion using a 3mm punch tool.  The specimens were obtained was placed in formalin, labeled, and sent to pathology for evaluation.  Bleeding was minimal, and stopped with gentle pressure and topical aluminum chloride.  Petroleum jelly and a bandage were applied, and wound care precautions/instructions were discussed.  The procedure was well tolerated without complication.          I was present for key portions of the procedures. KB  .I, Allison Driver MD, saw this patient with the resident and agree with the resident s findings and plan of care as documented in the resident s note.      Kit Caceres MD

## 2017-04-13 NOTE — MR AVS SNAPSHOT
After Visit Summary   4/13/2017    Nanci Van    MRN: [id number]           Patient Information     Date Of Birth          1960        Visit Information        Provider Department      4/13/2017 8:00 AM Kit Caceres MD [facility]        Today's Diagnoses     BCC (basal cell carcinoma), trunk    -  1    History of nonmelanoma skin cancer          Care Instructions    Excision Wound Care Instructions  I will experience scar, altered skin color, bleeding, swelling, pain, crusting and redness. I may experience altered sensation. Risks are excessive bleeding, infection, muscle weakness, thick (hypertrophic or keloidal) scar, and recurrence,. A second procedure may be recommended to obtain the best cosmetic or functional result.  Possible complications of any surgical procedure are bleeding, infection, scarring, alteration in skin color and sensation, muscle weakness in the area, wound dehiscence or seperation, or recurrence of the lesion or disease. On occasion, after healing, a secondary procedure or revision may be recommended in order to obtain the best cosmetic or functional result.   After your surgery, a pressure bandage will be placed over the area that has sutures. This will help prevent bleeding.  For the First 48 hours After Surgery:  1. Leave the pressure bandage on and keep it dry. If it should come loose, you may retape it, but do not take it off.  2. Relax and take it easy. Do not do any vigorous exercise, heavy lifting, or bending forward. This could cause the wound to bleed.  3. Post-operative pain is usually mild. You may take plain or extra strength Tylenol every 4 hours as needed (do not take more than 4,000mg in one day). Do not take any medicine that contains aspirin, ibuprofen or motrin unless you have been recommended these by a doctor.  Avoid alcohol and vitamin E as these may increase your tendency to bleed.  4. You may put an ice pack around the bandaged  area for 20 minutes every 2-3 hours. This may help reduce swelling, bruising, and pain. Make sure the ice pack is waterproof so that the pressure bandage does not get wet.   5. You may see a small amount of drainage or blood on your pressure bandage. This is normal. However, if drainage or bleeding continues or saturates the bandage, you will need to apply firm pressure over the bandage with a washcloth for 15 minutes. If bleeding continues after applying pressure for 15 minutes then go to the nearest emergency room.  48 Hours After Surgery  Carefully remove the bandage and start daily wound care and dressing changes. You may also now shower and get the wound wet. Wash wound with a mild soap and water.  Use caution when washing the wound. Be gentle and do not let the forceful shower stream hit the wound directly.  PAT dry.  Daily Wound Care:  1. Wash wound with a mild soap and water.  Use caution when washing the wound, be gentle and do not let the forceful shower stream hit the wound directly.  2. PAT DRY.  3. Apply Vaseline (from a new container or tube) over the suture line with a Q-tip. It is very important to keep the wound continuously moist, as wounds heal best in a moist environment.  4.  Keep the site covered until sutures are removed, you can cover it with a Telfa (non-stick) dressing and tape or a band-aid.    5. If you are unable to keep wound covered, you must apply Vaseline every 2 - 3 hours (while awake) to ensure it is being kept moist for optimal healing. A dressing overnight is recommended to keep the area moist.   Call Us If:  1. You have pain that is not controlled with Tylenol.  2. You have signs or symptoms of an infection, such as: fever over 100 degrees F, redness, warmth, or foul-smelling or yellow/creamy drainage from the wound.  Who should I call with questions?    [facility]: [phone]     [facility]: [phone]    For  urgent needs outside of business hours call the [facility] at [phone] and ask for the dermatology resident on call    Wound Care After a Biopsy    What is a skin biopsy?  A skin biopsy allows the doctor to examine a very small piece of tissue under the microscope to determine the diagnosis and the best treatment for the skin condition. A local anesthetic (numbing medicine)  is injected with a very small needle into the skin area to be tested. A small piece of skin is taken from the area. Sometimes a suture (stitch) is used.     What are the risks of a skin biopsy?  I will experience scar, bleeding, swelling, pain, crusting and redness. I may experience incomplete removal or recurrence. Risks of this procedure are excessive bleeding, bruising, infection, nerve damage, numbness, thick (hypertrophic or keloidal) scar and non-diagnostic biopsy.    How should I care for my wound for the first 24 hours?    Keep the wound dry and covered for 24 hours    If it bleeds, hold direct pressure on the area for 15 minutes. If bleeding does not stop then go to the emergency room    Avoid strenuous exercise the first 1-2 days or as your doctor instructs you    How should I care for the wound after 24 hours?    After 24 hours, remove the bandage    You may bathe or shower as normal    If you had a scalp biopsy, you can shampoo as usual and can use shower water to clean the biopsy site daily    Clean the wound twice a day with gentle soap and water    Do not scrub, be gentle    Apply white petroleum/Vaseline after cleaning the wound with a cotton swab or a clean finger, and keep the site covered with a Bandaid /bandage. Bandages are not necessary with a scalp biopsy    If you are unable to cover the site with a Bandaid /bandage, re-apply ointment 2-3 times a day to keep the site moist. Moisture will help with healing    Avoid strenuous activity for first 1-2 days    Avoid lakes, rivers, pools, and oceans until the stitches  are removed or the site is healed    How do I clean my wound?    Wash hands for 15 minutes with soap or use hand  before all wound care    Clean the wound with gentle soap and water    Apply white petroleum/Vaseline  to wound after it is clean    Replace the Bandaid /bandage to keep the wound covered for the first few days or as instructed by your doctor    If you had a scalp biopsy, warm shower water to the area on a daily basis should suffice    What should I use to clean my wound?     Cotton-tipped applicators (Qtips )    White petroleum jelly (Vaseline ). Use a clean new container and use Q-tips to apply.    Bandaids   as needed    Gentle soap     How should I care for my wound long term?    Do not get your wound dirty    Keep up with wound care for one week or until the area is healed.    A small scab will form and fall off by itself when the area is completely healed. The area will be red and will become pink in color as it heals. Sun protection is very important for how your scar will turn out. Sunscreen with an SPF 30 or greater is recommended once the area is healed.    If you have stitches, stitches need to be removed in  days. You may return to our clinic for this or you may have it done locally at your doctor s office.    You should have some soreness but it should be mild and slowly go away over several days. Talk to your doctor about using tylenol for pain,    When should I call my doctor?  If you have increased:     Pain or swelling    Pus or drainage (clear or slightly yellow drainage is ok)    Temperature over 100F    Spreading redness or warmth around wound    When will I hear about my results?  The biopsy results can take 2-3 weeks to come back. The clinic will call you with the results, send you a [employer] message, or have you schedule a follow-up clinic or phone time to discuss the results. Contact our clinics if you do not hear from us in 3 weeks.     Who should I call with  questions?    [facility]: [phone]     [facility]: [phone]    For urgent needs outside of business hours call the [facility] at [phone] and ask for the dermatology resident on call            Follow-ups after your visit        Your next 10 appointments already scheduled     Jun 29, 2017 10:45 AM CDT   (Arrive by 10:30 AM)   Return Visit with Kit Caceres MD   [facility] ([facility] [place])    [address] [place] [id number] [phone]              Who to contact     Please call your clinic at [phone] to:    Ask questions about your health    Make or cancel appointments    Discuss your medicines    Learn about your test results    Speak to your doctor   If you have compliments or concerns about an experience at your clinic, or if you wish to file a complaint, please contact [facility] Patient Relations at [phone] or email us at [email or web address]         Additional Information About Your Visit        [facility] [employer] Information     [employer] gives you secure access to your electronic health record. If you see a primary care provider, you can also send messages to your care team and make appointments. If you have questions, please call your primary care clinic.  If you do not have a primary care provider, please call [phone] and they will assist you.      [employer] is an electronic gateway that provides easy, online access to your medical records. With [employer], you can request a clinic appointment, read your test results, renew a prescription or communicate with your care team.     To access your existing account, please contact your [facility] or call [phone] for assistance.        Care EveryWhere ID     This is your Care EveryWhere ID. This could be used by other organizations to access your  [facility] medical records  [id number]         Blood Pressure from Last 3 Encounters:   06/15/16 170/81   08/07/14 136/76   01/24/14 111/71    Weight from Last 3 Encounters:   11/29/12 77.6 kg (171 lb)   01/12/10 76.9 kg (169 lb 8 oz)   10/20/08 78.9 kg (174 lb)              We Performed the Following     Surgical pathology exam          Today's Medication Changes          These changes are accurate as of: 4/13/17  9:25 AM.  If you have any questions, ask your nurse or doctor.               Start taking these medicines.        Dose/Directions    niacinamide 500 MG tablet   Used for:  History of nonmelanoma skin cancer   Started by:  Kit Caceres MD        Dose:  500 mg   Take 1 tablet (500 mg) by mouth 2 times daily (with meals)   Quantity:  60 tablet   Refills:  11            Where to get your medicines      These medications were sent to [facility] [address] [address]  [address] [address] [address] [facility] [address]     Phone:  [phone]     niacinamide 500 MG tablet                Primary Care Provider Office Phone # Fax #    Keturah Mckeon [phone]       [facility] [address]  [facility] [address]        Thank you!     Thank you for choosing [facility]  for your care. Our goal is always to provide you with excellent care. Hearing back from our patients is one way we can continue to improve our services. Please take a few minutes to complete the written survey that you may receive in the mail after your visit with us. Thank you!             Your Updated Medication List - Protect others around you: Learn how to safely use, store and throw away your medicines at www.disposemymeds.org.          This list is accurate as of: 4/13/17  9:25 AM.  Always use your most recent med list.                   Brand Name Dispense Instructions for use    ADDERALL PO      Take 40 mg by mouth daily       ammonium lactate 12  % lotion    LAC-HYDRIN    500 g    Apply topically 2 times daily       B COMPLEX-C-FOLIC ACID PO          calcitRIOL 0.25 MCG capsule    ROCALTROL     Take 0.25 mcg by mouth daily       cephALEXin 500 MG capsule    KEFLEX    20 capsule    Take 1 capsule (500 mg) by mouth 2 times daily       EFFEXOR XR PO      Take  by mouth. 300 mg       furosemide 20 MG tablet    LASIX     Take 20 mg by mouth daily Take 20 mg twice daily       HYDROcodone-acetaminophen 5-325 MG per tablet    NORCO    28 tablet    Take 1 tablet by mouth every 6 hours as needed for moderate to severe pain       insulin detemir 100 UNIT/ML injection    LEVEMIR     Inject Subcutaneous At Bedtime       liraglutide 18 MG/3ML soln    VICTOZA     Inject Subcutaneous daily       mupirocin 2 % ointment    BACTROBAN    22 g    Use 1 to 2 times a day to affected area.       niacinamide 500 MG tablet     60 tablet    Take 1 tablet (500 mg) by mouth 2 times daily (with meals)       predniSONE 5 MG tablet    DELTASONE    0    1 TABLET DAILY       PROTONIX 40 MG EC tablet   Generic drug:  pantoprazole     30    1 TABLET DAILY       PROZAC PO      Take 40 mg by mouth       SIMVASTATIN PO          sirolimus Tabs tablet     30 tablet    Take 1 tablet by mouth daily.       TEMAZEPAM PO      15 mg.       traZODone 50 MG tablet    DESYREL    60    1-2 pills at night as needed       UNKNOWN MED DOSAGE      A renal vitamin       VISTARIL PO          VITAMIN D PO

## 2017-04-13 NOTE — PROGRESS NOTES
DERMATOLOGY PROBLEM LIST  0. NUB, L medial hand, R 4th digit, bx  4/13/17  1. Hx renal transplantation 1987, on sirolimus/prednisone  - niacinamide 500 mg bid  2. NMSC, numerous, especially of her hands  - BCC, R shoulder, excised 4/13/17  - SCC, L lower leg, excised 1/12/17  - SCC, L anterior shin s/p MMS 6/15/16  Q3-4moTBSEs (last 3/30/17)    DERMATOLOGIC SURGERY REPORT    NAME OF PROCEDURE:  EXCISION AND INTERMEDIATE CLOSURE    Surgeon:  Zenobia    PREOPERATIVE DIAGNOSIS:   Nodular/superficial basal cell carcinoma (R shoulder)  POSTOPERATIVE DIAGNOSIS: Same  FINAL EXCISION SIZE:  2.0 cm x 5.5 cm, with 4 mm margin  FINAL REPAIR LENGTH:  5.5 cm    INDICATIONS:  This patient presented with a 1.2 cm x 1.2 cm basal cell carcinoma on the R shoulder.  Excision was indicated.  We discussed the principles of treatment and most likely complications including bleeding, infection, wound dehiscence, pain, nerve damage, and scarring.  Informed consent was obtained and the patient underwent the procedure as follows.    PROCEDURE:  The patient was taken to the operative suite.  The treatment area was anesthetized with 1% lidocaine and epinephrine.  The area was washed with Hibiclens, rinsed with saline and draped with sterile towels.  The lesion was delineated and excised down to subcutaneous fat.  Hemostasis was obtained by electrocoagulation.  With a margin of 4 mm, the final excision size was 2.0 cm x 5.5 cm.      REPAIR:  In order to repair this defect while maintaining the normal anatomic relations and function, we elected to utilize an intermediate linear closure.  Closure was oriented so that the wound was in the patient's natural skin tension lines.  Deeper layers of the subcutaneous tissue were undermined first.  Deep dermal and subcutaneous layer closure was performed using for 4-0 Vicryl deep, intradermal and subcutaneous sutures.  Two redundant columns were removed by triangulation.  Final cutaneous  approximation was achieved with 4-0 Prolene simple running sutures.     The final wound length was 4.0 cm.  A total of 6 ml of anesthesia was administered for all surgical sites.  Estimated blood loss was less than 1 ml for all surgical sites.  A sterile pressure dressing was applied and wound care instructions, with a written handout, were given.  The patient was [facility] alert and ambulatory.      BIOPSY PROCEDURE NOTE  After informed written consent was obtained, using alcohol for cleansing and 1% Lidocaine with epinephrine for anesthetic, a shave biopsy was performed using a Laurel blade of the R 4th digit lesion, and a punch biopsy was performed on the L medial hand lesion using a 3mm punch tool.  The specimens were obtained was placed in formalin, labeled, and sent to pathology for evaluation.  Bleeding was minimal, and stopped with gentle pressure and topical aluminum chloride.  Petroleum jelly and a bandage were applied, and wound care precautions/instructions were discussed.  The procedure was well tolerated without complication.          I was present for key portions of the procedures. KB  .I, Allison Driver MD, saw this patient with the resident and agree with the resident s findings and plan of care as documented in the resident s note.

## 2017-04-17 LAB
COPATH REPORT: NORMAL
COPATH REPORT: NORMAL

## 2017-04-24 DIAGNOSIS — Z85.828 HISTORY OF NONMELANOMA SKIN CANCER: ICD-10-CM

## 2017-04-24 DIAGNOSIS — L57.8 SUN-DAMAGED SKIN: ICD-10-CM

## 2017-04-24 DIAGNOSIS — L57.0 AK (ACTINIC KERATOSIS): Primary | ICD-10-CM

## 2017-04-24 RX ORDER — NIACINAMIDE 500 MG
500 TABLET ORAL 2 TIMES DAILY WITH MEALS
Qty: 60 TABLET | Refills: 11 | Status: SHIPPED | OUTPATIENT
Start: 2017-04-24

## 2017-04-24 RX ORDER — FLUOROURACIL 50 MG/G
CREAM TOPICAL
Qty: 40 G | Refills: 1 | Status: SHIPPED | OUTPATIENT
Start: 2017-04-24

## 2017-04-24 RX ORDER — CALCIPOTRIENE 50 UG/G
OINTMENT TOPICAL
Qty: 60 G | Refills: 1 | Status: SHIPPED | OUTPATIENT
Start: 2017-04-24

## 2017-04-25 NOTE — PROGRESS NOTES
Reached by phone to discuss biopsy results. WIll plan for 5-FU + calcipotriene x4d. She will call/send mychart with response, keep plan for late June clinic follow-up. Advised scheduled MMS for R cheek SCC.

## 2017-04-27 ENCOUNTER — TRANSFERRED RECORDS (OUTPATIENT)
Dept: HEALTH INFORMATION MANAGEMENT | Facility: CLINIC | Age: 57
End: 2017-04-27

## 2017-05-01 NOTE — TELEPHONE ENCOUNTER
Prior Authorization Not Needed per Insurance    Medication: RAPAMUNE 1 MG tablet   Insurance Company: [employer] - Phone [phone] Fax [phone]  Expected CoPay: [id number]    Pharmacy Filling the Rx: [facility] - [place], [place] - [facility] [address]   Pharmacy Notified: Yes  Patient Notified: Yes    Part-B coverage available.

## 2017-05-15 ENCOUNTER — TELEPHONE (OUTPATIENT)
Dept: DERMATOLOGY | Facility: CLINIC | Age: 57
End: 2017-05-15

## 2017-05-16 NOTE — TELEPHONE ENCOUNTER
History of Skin cancer : yes    History of Mohs Surgery: yes    History of a solid organ transplant: yes Organ(s): kidney Year(s): 1983 1987 Creatinine: 1.3 unknown date  Bone Marrow Transplant : no (year, )    Immunosuppressive Medications: yes (if yes, which ones: Rapamune and prednisone)    HIV or Hepatitis B or C : no    Chronic lymphocytic leukemia: no    Diabetes: yes (Type 1 or 2: 2)    Any Bleeding disorders: no    Do you have a Pacemaker or Defibrillator: no (year placed: )    Artificial heart valve: no (mechanical or porcine: )    Joint Replacement in the last 2 years: no Joint(s):  Year(s):     Do you typically take Prophylactic Antibiotics before seeing a dentist or having a procedure?: no    If yes, verify that patient has the antibiotic on hand and instruct to take one hour before their surgery appointment.    If they do not have the antibiotic, verify the patients pharmacy and notify Dr. Burrell by printing the pre-mohs call sheet.    Do you wear a C Pap Mask: no    If yes, and procedure is on the face, ask patient to bring in the mask with them (Mask only)    Do you have any mobility issues: walker and balance issues    If yes, is a van service is required to transport you to/from your appointment? no    Smoking History (tobacco of any sort): no    Do you have any other health issues that we should know about? Hard time breathing-sees pulmonologist    Do you take any of the following Blood Thinners:    Aspirin: yes 81mg    Plavix/Aggrastat/Brilinta: no    Warfarin: no (Last INR:  Date: )    Pradaxa/Eliquis/Xarelto: no    Ibuprofen (Advil/Motrin): no    Naproxen (Aleve): no    Vitamin E: no    Fish Oil: no    Ginkgo biloba: no    Other:no    Paient was instructed of the following: Ibuprofen, naproxen, Vitamin E, Fish Oil, and Ginkgo biloba should be stopped 1 week prior to and 1 week after the procedure.    Medication Allergies: yes    Are medications in Epic: yes    Patient was reminded to take all  medications as usual, other than those listed above, on the day of surgery    Patient was instructed to bring all medications to clinic on day of surgery    Patient will bring a     (A  is helpful for the following reasons: some patients need medications to relax, but once given, patients should not drive; sometimes bandages obstruct your vision making it difficult to see and unsafe to drive; the day can get long so it s nice to have a soila; sometimes the procedure wears people out/makes them quite tired)    Photograph of the surgical site(s) is/are available    Patient was reminded that this can be an all-day procedure and that no other appointments should be scheduled on the day of Mohs

## 2017-05-23 ENCOUNTER — OFFICE VISIT (OUTPATIENT)
Dept: DERMATOLOGY | Facility: CLINIC | Age: 57
End: 2017-05-23

## 2017-05-23 VITALS — SYSTOLIC BLOOD PRESSURE: 147 MMHG | DIASTOLIC BLOOD PRESSURE: 85 MMHG | HEART RATE: 71 BPM

## 2017-05-23 DIAGNOSIS — C44.320 SQUAMOUS CELL CARCINOMA OF SKIN OF FACE: Primary | ICD-10-CM

## 2017-05-23 RX ORDER — HYDROCODONE BITARTRATE AND ACETAMINOPHEN 5; 325 MG/1; MG/1
1 TABLET ORAL EVERY 6 HOURS PRN
Qty: 5 TABLET | Refills: 0 | Status: SHIPPED | OUTPATIENT
Start: 2017-05-23

## 2017-05-23 RX ORDER — PROPRANOLOL HYDROCHLORIDE 10 MG/1
TABLET ORAL
COMMUNITY
Start: 2017-04-10

## 2017-05-23 RX ORDER — DOXYCYCLINE 100 MG/1
100 CAPSULE ORAL
COMMUNITY
Start: 2017-05-19 | End: 2017-05-29

## 2017-05-23 RX ORDER — ARIPIPRAZOLE 5 MG/1
5 TABLET ORAL
COMMUNITY
Start: 2017-05-15

## 2017-05-23 ASSESSMENT — PAIN SCALES - GENERAL
PAINLEVEL: MODERATE PAIN (5)
PAINLEVEL: NO PAIN (0)

## 2017-05-23 NOTE — NURSING NOTE
2nd layer performed on the right cheek. Injected 3ml of Xylocaine  (Lidocaine 1% and Epinephrine  (1:100,000))      Present for procedure:   Ashanti FONG, Tyson Blair MD

## 2017-05-23 NOTE — MR AVS SNAPSHOT
After Visit Summary   5/23/2017    Nanci Van    MRN: [id number]           Patient Information     Date Of Birth          1960        Visit Information        Provider Department      5/23/2017 8:30 AM Tiana Burrell MD [facility] Dermatologic Surgery        Care Instructions    Sutured Wound Care  Caring for your skin after surgery:    After your surgery, a pressure bandage will be placed over the area that has stitches. This will prevent bleeding. Please follow these instructions over the next 1 to 2 weeks. Following this regimen will help to prevent complications as your wound heals.      No strenuous activity for 48 hours. Resume moderate activity in 48 hours. No heavy exercising until you are seen for follow up.    Take Tylenol one hour after surgery. Take Tylenol every 4 hours as needed and do not take any aspirin products for pain (continue taking aspirin if prescribed by your doctor to prevent heart attacks and strokes).    Do not drink alcoholic beverages for 48 hours.    No dietary restrictions.    Keep the pressure bandage in place for 24 hours. If the bandage becomes blood tinged or loose, reinforce it with gauze and tape.     Keep the bandage dry. Wash around it carefully    If the tape becomes soiled or starts to come off, reinforce it with additional paper tape.    Do not smoke for 3 weeks; smoking is detrimental to wound healing.    It is normal to have swelling and bruising around the surgical site. The bruising will fade in approximately 10-14 days. Elevate the area to reduce swelling.    Numbness, itchiness and sensitivity to temperature changes can occur after surgery and may take up to 18 months to normalize.  Remove the outer pressure bandage in 24 hours. Leave the flat bandage in place for 10 days or until your follow-up appointment if it is within those 10 days.     Bleeding:  You may see a small amount of drainage or blood on your pressure dressing. This is  normal and the following instructions should be followed if the wound is bleeding saturates the dressing.    1. Leave the bandage in place.  2. Use tightly rolled up gauze or a cloth to apply direct pressure over the bandage for 20 minutes.  3. Reapply pressure for an additional 20 minutes if necessary.  4. Call the office or go to the nearest emergency room if pressure fails to stop the bleeding.  5. Use additional gauze and tape to maintain pressure once the bleeding has stopped.    Pain:  1. Post operative pain should slowly get better, never worse.  2. A severe increase in pain may indicate a problem. Call the office if this occurs.  3. You may use ice directly over the dressing, but make sure the dressing does not get wet.    Phone numbers:  During business hours (M-F 8:00-4:30 p.m.)  [facility]-  [phone] Option 1 appt. desk  [phone]  Option 3 nurse triage line  ---------------------------------------------------------  Evenings/Weekends/Holidays  Hospital - [phone]   TTY for hearing oazpznmi-[phone]  *Ask  to page dermatologist on-call  Emergency Xwnp-[phone]  TTY for hearing impaired- [phone]          Follow-ups after your visit        Your next 10 appointments already scheduled     May 30, 2017  3:30 PM CDT   (Arrive by 3:15 PM)   Return Visit with Tiana Burrell MD   [facility] Dermatologic Surgery ([facility])    [address] [place] [address] [phone]            Jun 29, 2017 10:45 AM CDT   (Arrive by 10:30 AM)   Return Visit with Kit Caceres MD   [facility] Dermatology ([facility] [place])    [address] [place] [address] [phone]              Who to contact     Please call your clinic at [phone] to:    Ask questions about your health    Make or cancel appointments    Discuss your medicines    Learn about your  test results    Speak to your doctor   If you have compliments or concerns about an experience at your clinic, or if you wish to file a complaint, please contact [facility] Patient Relations at [phone] or email us at [email or web address]         Additional Information About Your Visit        [employer] Information     [employer] gives you secure access to your electronic health record. If you see a primary care provider, you can also send messages to your care team and make appointments. If you have questions, please call your primary care clinic.  If you do not have a primary care provider, please call [phone] and they will assist you.      [employer] is an electronic gateway that provides easy, online access to your medical records. With [employer], you can request a clinic appointment, read your test results, renew a prescription or communicate with your care team.     To access your existing account, please contact your [facility] or call [phone] for assistance.        Care EveryWhere ID     This is your Care EveryWhere ID. This could be used by other organizations to access your [facility] medical records  [id number]        Your Vitals Were     Pulse                   71            Blood Pressure from Last 3 Encounters:   05/23/17 147/85   06/15/16 170/81   08/07/14 136/76    Weight from Last 3 Encounters:   11/29/12 77.6 kg (171 lb)   01/12/10 76.9 kg (169 lb 8 oz)   10/20/08 78.9 kg (174 lb)              Today, you had the following     No orders found for display         Today's Medication Changes          These changes are accurate as of: 5/23/17 12:35 PM.  If you have any questions, ask your nurse or doctor.               Stop taking these medicines if you haven't already. Please contact your care team if you have questions.     cephALEXin 500 MG capsule   Commonly known as:  KEFLEX   Stopped by:  Tiana Burrell MD            HYDROcodone-acetaminophen 5-325 MG per tablet   Commonly known as:  NORCO   Stopped by:  Tiana Burrell MD           liraglutide 18 MG/3ML soln   Commonly known as:  VICTOZA   Stopped by:  Tiana Burrell MD           UNKNOWN MED DOSAGE   Stopped by:  Tiana Burrell MD                    Primary Care Provider Office Phone # Fax     Keturah Mckeon [phone]       [facility] [address]  [facility] [place] [address]        Thank you!     Thank you for choosing [facility] DERMATOLOGIC SURGERY  for your care. Our goal is always to provide you with excellent care. Hearing back from our patients is one way we can continue to improve our services. Please take a few minutes to complete the written survey that you may receive in the mail after your visit with us. Thank you!             Your Updated Medication List - Protect others around you: Learn how to safely use, store and throw away your medicines at www.disposemymeds.org.          This list is accurate as of: 5/23/17 12:35 PM.  Always use your most recent med list.                   Brand Name Dispense Instructions for use    ADDERALL PO      Take 40 mg by mouth daily       ammonium lactate 12 % lotion    LAC-HYDRIN    500 g    Apply topically 2 times daily       ARIPiprazole 5 MG tablet    ABILIFY     Take 5 mg by mouth       B COMPLEX-C-FOLIC ACID PO          BREO ELLIPTA 200-25 MCG/INH oral inhaler   Generic drug:  fluticasone-vilanterol      Inhale 1 puff into the lungs daily       calcipotriene 0.005 % Oint     60 g    Apply with equal parts 5-fluorouracil cream twice daily to the arms and hands until a reaction develops (minimum of four days).       calcitRIOL 0.25 MCG capsule    ROCALTROL     Take 0.25 mcg by mouth daily       COMBIVENT RESPIMAT  MCG/ACT inhaler   Generic drug:  Ipratropium-Albuterol      Inhale 1 puff into the lungs 4 times daily       doxycycline Monohydrate 100 MG Caps       Take 100 mg by mouth       EFFEXOR XR PO      Take  by mouth. 300 mg       fluorouracil 5 % cream    EFUDEX    40 g    Apply with equal parts calcipotriene ointment twice daily to the arms and hands until a reaction develops (minimum of four days).       furosemide 20 MG tablet    LASIX     Take 20 mg by mouth daily Take 20 mg twice daily       insulin detemir 100 UNIT/ML injection    LEVEMIR     Inject Subcutaneous At Bedtime       mupirocin 2 % ointment    BACTROBAN    22 g    Use 1 to 2 times a day to affected area.       niacinamide 500 MG tablet     60 tablet    Take 1 tablet (500 mg) by mouth 2 times daily (with meals)       potassium 75 MG Tabs          predniSONE 5 MG tablet    DELTASONE    0    1 TABLET DAILY       propranolol 10 MG tablet    INDERAL     TAKE 1 TABLET BY MOUTH TWICE A DAY FOR HAND TREMOR       PROTONIX 40 MG EC tablet   Generic drug:  pantoprazole     30    1 TABLET DAILY       PROZAC PO      Take 40 mg by mouth       SIMVASTATIN PO          sirolimus Tabs tablet     30 tablet    Take 1 tablet by mouth daily.       TEMAZEPAM PO      15 mg.       traZODone 50 MG tablet    DESYREL    60    1-2 pills at night as needed       VISTARIL PO          VITAMIN D PO

## 2017-05-23 NOTE — NURSING NOTE
Chief Complaint   Patient presents with     Skin Cancer     loreto is here today for a mohs and possible reconstructions of the right cheek     Tyosn Medina LPN

## 2017-05-23 NOTE — NURSING NOTE
Applied steri-strips and paper tape to cheek. Dental rolls, micropore tape with gauze and coban for pressure. Instructions gone over with patient and printed. All patient questions answered. Coban and paper tape given to patient for their use.    Socorro Paniagua CMA

## 2017-05-23 NOTE — PROGRESS NOTES
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  The area was cleansed with chlorhexidine and infiltrated with 1% Lidocaine and epinephrine. Tumor was debulked. Using a #15-blade, complete excision was made around the tumor in 1 section.  Hemostasis was obtained by electrodesiccation.  A dressing was placed.  Tissue was divided into 2 tissue blocks that were subsequently mapped, color coded and processed in the Mohs Laboratory.  Microscopic tumor (SCCIS) was found in 2 of the tissue blocks.    STAGE II: The patient returned to the operating room.  By reference to the Mohs map, the area of positivity was delineated, infiltrated with the anesthetic mixture described above and excised in 2 sections. Tissue was divided into 2 tissue blocks that were again mapped, color coded and processed in the Mohs Laboratory.  Hemostasis was obtained in the usual manner and a dressing placed.  Microscopic tumor was not found in any of the tissue blocks.    With the lesion clear of micrographic tumor, surgery was considered complete.  The defect extended to the subcutis and measured 1.4 x 1.9 cm.    RECONSTRUCTIVE DERMATOLOGIC SURGERY REPORT  We discussed the options for wound management in full with the patient including risks/benefits/possible outcomes.     Complex Layered Linear Closure:  Because of the size and full thickness nature of the defect and tightness of the surrounding skin, a complex closure was planned.  Patient was prepped with Betasept,  local anesthesia  administered, and draped in a sterile fashion. The Mohs defect/wound was circumferentially debeveled. Burow s triangles were excised in the relaxed skin tension lines from opposite ends of the defect, oriented tangentially, and the wound edges were widely undermined by dissection in the subcutaneous plane until adequate tissue mobility had been obtained and tissue could be cosmetically redraped.  Hemostasis was obtained.  The wound edges were then advanced and closed in a layered fashion.  Postoperative length was 5.2 cm.      Estimated blood loss, minimal; complications, none; bandaging and wound care, routine. Patient was discharged in good condition and will return for bandage change in 1 week.    Tiana Burrell MD  , Department of Dermatology  Director, [facility]  Phone: [phone]; Fax: [phone]    [facility] Dermatologic Surgery [facility]   [address] [place] [address]   Mail Code [id number]   [place] [address]

## 2017-05-23 NOTE — LETTER
5/23/2017       RE: Nanci Van  [address]   [facility] [address]     Dear Colleague,    Thank you for referring your patient, Nanci Van, to the [facility] DERMATOLOGIC SURGERY at [facility]. Please see a copy of my visit note below.    [facility] Mohs Micrographic Surgery Note:  May 23, 2017  Nanci Van is a 57 year old female who was referred by Dr. 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 The lesion was excised using Mohs micrographic surgery in 2 stage(s), and the defect repaired by linear closure. Please refer to the operative report(s).     Prescibed Norco, 1 tab of 5/325 mg PO Q6 hours PRN pain, #5, given patient unable to take ibuprofen.     The patient was asked to return in 1 week(s) for bandage change/wound check.     Jovan Driver & Morris are planning to treat patient with Efudex/Dovonex combo to face in future which I agree would be very siu.    Additionally, she complained of a very tender spot on her shoulder, recently treated by excision. Area showed central tenacious crusting along most of incision line. Recommended hydrogen peroxide on cotton ball 1 time daily followed by vaseline. Instructed to stop HPO once crust is debrided, and to continue to cover with vaseline and bandage until healed.    Follow up with Dr. Driver and Morris in June and me in September for ful skin exam.     Thank you for the opportunity to see and treat your patient. Please do not hesitate to contact me with any questions or concerns regarding the findings and/or operation.    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  The area was cleansed with chlorhexidine and infiltrated with 1% Lidocaine and epinephrine. Tumor was debulked. Using a #15-blade, complete excision was made around the tumor in 1 section.  Hemostasis was obtained by electrodesiccation.  A dressing was placed.  Tissue was divided into 2 tissue blocks that were subsequently mapped, color coded and processed in the Mohs Laboratory.  Microscopic tumor (SCCIS) was found in 2 of the tissue blocks.    STAGE II: The patient returned to the operating room.  By reference to the Mohs map, the area of positivity was delineated, infiltrated with the anesthetic mixture described above and excised in 2 sections. Tissue was divided into 2 tissue blocks that were again mapped, color coded and processed in the Mohs Laboratory.  Hemostasis was obtained in the usual manner and a dressing placed.  Microscopic tumor was not found in any of the tissue blocks.    With the lesion clear of micrographic tumor, surgery was considered complete.  The defect extended to the subcutis and measured 1.4 x 1.9 cm.    RECONSTRUCTIVE DERMATOLOGIC SURGERY REPORT  We discussed the options for wound management in full with the patient including risks/benefits/possible outcomes.     Complex Layered Linear Closure:  Because of the size and full thickness nature of the defect and tightness of the surrounding skin, a complex closure was planned.  Patient was prepped with Betasept,  local anesthesia  administered, and draped in a sterile fashion. The Mohs defect/wound was circumferentially debeveled. Burow s triangles were excised in the relaxed skin tension lines from opposite ends of the defect, oriented tangentially, and the wound edges were widely undermined by dissection in the subcutaneous plane until adequate tissue mobility had been obtained and tissue could be cosmetically redraped.  Hemostasis was obtained.  The wound edges were then advanced and closed in a layered fashion.  Postoperative length was 5.2 cm.      Estimated blood loss, minimal; complications, none; bandaging and wound care, routine. Patient was discharged in good condition and will return for bandage change in 1 week.    Tiana Burrell MD  , Department of Dermatology  Director, D[facility]  Phone: [phone]; Fax: [phone]    [facility] Dermatologic Surgery & Laser Center   [address] [place] [address]   Mail Code [id number]   [place] [address]

## 2017-05-23 NOTE — PROGRESS NOTES
[facility] Mohs Micrographic Surgery Note:  May 23, 2017  Nanci Van is a 57 year old female who was referred by Dr. 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 The lesion was excised using Mohs micrographic surgery in 2 stage(s), and the defect repaired by linear closure. Please refer to the operative report(s).     Prescibed Norco, 1 tab of 5/325 mg PO Q6 hours PRN pain, #5, given patient unable to take ibuprofen.     The patient was asked to return in 1 week(s) for bandage change/wound check.     Jovan Driver & Morris are planning to treat patient with Efudex/Dovonex combo to face in future which I agree would be very siu.    Additionally, she complained of a very tender spot on her shoulder, recently treated by excision. Area showed central tenacious crusting along most of incision line. Recommended hydrogen peroxide on cotton ball 1 time daily followed by vaseline. Instructed to stop HPO once crust is debrided, and to continue to cover with vaseline and bandage until healed.    Follow up with Dr. Driver and Morris in June and me in September for ful skin exam.     Thank you for the opportunity to see and treat your patient. Please do not hesitate to contact me with any questions or concerns regarding the findings and/or operation.    Tiana Burrell MD  , Department of Dermatology  Director, D[facility]at[facility] Surgery [facility]  Phone: [phone]; Fax: [phone]  [email or web address] Dermatologic Surgery [facility] Laser Center   [address] [place] [address]   Mail Code [id number]   [place] [address]

## 2017-05-23 NOTE — NURSING NOTE
1st layer performed on the Right Cheek. Injected 5.0ml of Xylocaine  (Lidocaine 1% and Epinephrine  (1:100,000)      Present for procedure: Dr. Burrell, Ijeoma Blair MD and Henrietta Dixon CMA

## 2017-05-23 NOTE — NURSING NOTE
closure layer performed on the right cheek. Injected 6.0ml of Xylocaine  (Lidocaine 1% and Epinephrine  (1:100,000))      Present for procedure: Dr. Burrell, Kit Caceres Resident MD Socorro Paniagua CMA

## 2017-05-23 NOTE — PATIENT INSTRUCTIONS
Sutured Wound Care  Caring for your skin after surgery:    After your surgery, a pressure bandage will be placed over the area that has stitches. This will prevent bleeding. Please follow these instructions over the next 1 to 2 weeks. Following this regimen will help to prevent complications as your wound heals.      No strenuous activity for 48 hours. Resume moderate activity in 48 hours. No heavy exercising until you are seen for follow up.    Take Tylenol one hour after surgery. Take Tylenol every 4 hours as needed and do not take any aspirin products for pain (continue taking aspirin if prescribed by your doctor to prevent heart attacks and strokes).    Do not drink alcoholic beverages for 48 hours.    No dietary restrictions.    Keep the pressure bandage in place for 24 hours. If the bandage becomes blood tinged or loose, reinforce it with gauze and tape.     Keep the bandage dry. Wash around it carefully    If the tape becomes soiled or starts to come off, reinforce it with additional paper tape.    Do not smoke for 3 weeks; smoking is detrimental to wound healing.    It is normal to have swelling and bruising around the surgical site. The bruising will fade in approximately 10-14 days. Elevate the area to reduce swelling.    Numbness, itchiness and sensitivity to temperature changes can occur after surgery and may take up to 18 months to normalize.  Remove the outer pressure bandage in 24 hours. Leave the flat bandage in place for 10 days or until your follow-up appointment if it is within those 10 days.     Bleeding:  You may see a small amount of drainage or blood on your pressure dressing. This is normal and the following instructions should be followed if the wound is bleeding saturates the dressing.    1. Leave the bandage in place.  2. Use tightly rolled up gauze or a cloth to apply direct pressure over the bandage for 20 minutes.  3. Reapply pressure for an additional 20 minutes if necessary.  4. Call  the office or go to the nearest emergency room if pressure fails to stop the bleeding.  5. Use additional gauze and tape to maintain pressure once the bleeding has stopped.    Pain:  1. Post operative pain should slowly get better, never worse.  2. A severe increase in pain may indicate a problem. Call the office if this occurs.  3. You may use ice directly over the dressing, but make sure the dressing does not get wet.    Phone numbers:  During business hours (M-F 8:00-4:30 p.m.)  Dermatologic Surgery [facility] Center-  [phone] Option 1 appt. desk  [phone]  Option 3 nurse triage line  ---------------------------------------------------------  Evenings/Weekends/Holidays  Hospital - [phone]   TTY for hearing wjjiigui-[phone]  *Ask  to page dermatologist on-call  Emergency Bdlv-[phone]  TTY for hearing impaired- [phone]

## 2017-05-30 ENCOUNTER — OFFICE VISIT (OUTPATIENT)
Dept: DERMATOLOGY | Facility: CLINIC | Age: 57
End: 2017-05-30

## 2017-05-30 DIAGNOSIS — Z98.890 STATUS POST MOHS SURGERY: Primary | ICD-10-CM

## 2017-05-30 ASSESSMENT — PAIN SCALES - GENERAL: PAINLEVEL: MILD PAIN (3)

## 2017-05-30 NOTE — NURSING NOTE
Mrs. Van came in today for a wound check. Her incision site looked great. No signs and symptoms of infection only complaint was a small amount of tenderness at the top of the incision. She was very pleased with the sutures. Site was cleaned and a Comfeel dressing was placed. Mrs. Van was given the extra of the dressing and had no questions about wound care.    Tyson Medina, YUNG

## 2017-05-30 NOTE — PATIENT INSTRUCTIONS
Wound care instructions for  ONE WEEK AFTER SURGERY    1. Leave the bandage on for 1 week after today's bandage change.  2. In 1 week you may resume your regular skin care when you remove the dressing. This includes washing with mild soap and water, applying moisturizer, make-up and sunscreen.  3. If the wound is open or bleeding in any part of the incision/graft site, you should cover the area with a bandage daily as directed below:    1. Clean and dry area with plain water using a Q-tip or sterile gauze pad.  2. Apply vaseline, aquaphor, polysporin, or bacitracin ointment to the wound  3. Cover the wound with a band-aid or a sterile non-stick gauze pad and micropore paper tape.      Repeat the instructions above until wound is completely healed    If you notice that the scar is red and firm (after you remove the dressing) this is normal and will fade over time. It may take up to 6-12 months for this to occur.    Massaging the area helps the scar fade and soften quicker. Begin to massage the area one month after the dressings have been removed. Apply pressure directly and firmly over the scar with the fingertips and move in circular motions. You may massage up to 10 times daily, each time for 30 seconds.    It is normal for there to be a tender, pimple-like bump along the scar about 6-8 weeks after surgery. This sometimes happens as the scar matures and the stitches beneath begin to dissolve. Do not pick or squeeze. It will resolve in time. If an area of the wound does open and there appears to be drainage, you may apply one of the ointments listed in the above directions a few times every day until the wound is completely healed.    Numbness around the surgical site is normal. It can take up to 12-18 months for the feeling to return to normal. Itchiness, tingling, and occasional sharp pains might be present during this portion of the healing. All of these feelings will subside once the nerves have completely  healed.      Phone numbers:  During business hours (M-F 8:00-4:30 p.m.)  [facility]-  [phone] Option 1 appt. desk  [phone]  Option 3 nurse triage line  ---------------------------------------------------------  Evenings/Weekends/Holidays  Hospital - [phone]   TTY for hearing lnuixyrn-[phone]  *Ask  to page dermatologist on-call  Emergency Frsw-[phone]  TTY for hearing impaired- [phone]

## 2017-05-30 NOTE — LETTER
5/30/2017       RE: Nanci Van  [address]     [facility] [address]     Dear Colleague,    Thank you for referring your patient, Nanci Van, to the [facility] DERMATOLOGIC SURGERY at [facility]. Please see a copy of my visit note below.    [facility] Dermatology Post-operative Visit Note:  May 30, 2017  Subjective: The patient follows up for a wound check. The patient reports doing well without any bleeding, purulence, or excess pain/tenderness at the site(s).     ROS: No fevers, chills or malaise    Objective:  Appropriately healing surgical site(s) on the right cheek without purulence or tenderness and an appropriate amount of expected surrounding erythema with completely opposed epidermal edges.    Assessment and Plan:   Appropriately healing surgical site without any signs of infection status post Mohs on 5/23/17 for SCCIS of right cheek.    --Duoderm bandage placed; full instructions as per AVS.  --patient will be applying Efudex to whole face starting in 1 week after removing bandage.     Return in June with Dr. Morris TONEY and with me in September; sooner with any new, changing or rapidly growing lesions.     Staff:  Tiana Burrell MD  , Department of Dermatology  Director, [facility]  Phone: [phone]; Fax: [phone]  [email or web address] - [email or web address]   [address]    Mail Code [id number]   [place] [address]    Picture placed in chart for future reference.

## 2017-05-30 NOTE — MR AVS SNAPSHOT
After Visit Summary   5/30/2017    Nanci Van    MRN: [id number]           Patient Information     Date Of Birth          1960        Visit Information        Provider Department      5/30/2017 3:30 PM Tiana Burrell MD [facility] Dermatologic Surgery        Care Instructions    Wound care instructions for  ONE WEEK AFTER SURGERY    1. Leave the bandage on for 1 week after today's bandage change.  2. In 1 week you may resume your regular skin care when you remove the dressing. This includes washing with mild soap and water, applying moisturizer, make-up and sunscreen.  3. If the wound is open or bleeding in any part of the incision/graft site, you should cover the area with a bandage daily as directed below:    1. Clean and dry area with plain water using a Q-tip or sterile gauze pad.  2. Apply vaseline, aquaphor, polysporin, or bacitracin ointment to the wound  3. Cover the wound with a band-aid or a sterile non-stick gauze pad and micropore paper tape.      Repeat the instructions above until wound is completely healed    If you notice that the scar is red and firm (after you remove the dressing) this is normal and will fade over time. It may take up to 6-12 months for this to occur.    Massaging the area helps the scar fade and soften quicker. Begin to massage the area one month after the dressings have been removed. Apply pressure directly and firmly over the scar with the fingertips and move in circular motions. You may massage up to 10 times daily, each time for 30 seconds.    It is normal for there to be a tender, pimple-like bump along the scar about 6-8 weeks after surgery. This sometimes happens as the scar matures and the stitches beneath begin to dissolve. Do not pick or squeeze. It will resolve in time. If an area of the wound does open and there appears to be drainage, you may apply one of the ointments listed in the above directions a few times every day until the  wound is completely healed.    Numbness around the surgical site is normal. It can take up to 12-18 months for the feeling to return to normal. Itchiness, tingling, and occasional sharp pains might be present during this portion of the healing. All of these feelings will subside once the nerves have completely healed.      Phone numbers:  During business hours (M-F 8:00-4:30 p.m.)  [facility]-  [phone] Option 1 appt. desk  [phone]  Option 3 nurse triage line  ---------------------------------------------------------  Evenings/Weekends/Holidays  Hospital - [phone]   TTY for hearing fycyujsp-[phone]  *Ask  to page dermatologist on-call  Emergency Ullx-[phone]  TTY for hearing impaired- [phone]            Follow-ups after your visit        Your next 10 appointments already scheduled     Jun 29, 2017 10:45 AM CDT   (Arrive by 10:30 AM)   Return Visit with Kit Caceres MD   [facility] Dermatology ([facility])    [address] [place] [id number] [phone]              Who to contact     Please call your clinic at [phone] to:    Ask questions about your health    Make or cancel appointments    Discuss your medicines    Learn about your test results    Speak to your doctor   If you have compliments or concerns about an experience at your clinic, or if you wish to file a complaint, please contact [facility] Patient Relations at [phone] or email us at [email or web address]         Additional Information About Your Visit        MyChart Information     [employer] gives you secure access to your electronic health record. If you see a primary care provider, you can also send messages to your care team and make appointments. If you have questions, please call your primary care clinic.  If you do not have a primary care provider, please call [phone] and  they will assist you.      [employer] is an electronic gateway that provides easy, online access to your medical records. With [employer], you can request a clinic appointment, read your test results, renew a prescription or communicate with your care team.     To access your existing account, please contact your [facility] or call [phone] for assistance.        Care EveryWhere ID     This is your Care EveryWhere ID. This could be used by other organizations to access your [facility] medical records  [id number]         Blood Pressure from Last 3 Encounters:   05/23/17 147/85   06/15/16 170/81   08/07/14 136/76    Weight from Last 3 Encounters:   11/29/12 77.6 kg (171 lb)   01/12/10 76.9 kg (169 lb 8 oz)   10/20/08 78.9 kg (174 lb)              Today, you had the following     No orders found for display       Primary Care Provider Office Phone # Fax #    Keturah Mckeon [phone]       [facility] [address]  [facility] [address]        Thank you!     Thank you for choosing [facility] DERMATOLOGIC SURGERY  for your care. Our goal is always to provide you with excellent care. Hearing back from our patients is one way we can continue to improve our services. Please take a few minutes to complete the written survey that you may receive in the mail after your visit with us. Thank you!             Your Updated Medication List - Protect others around you: Learn how to safely use, store and throw away your medicines at www.disposemymeds.org.          This list is accurate as of: 5/30/17  3:44 PM.  Always use your most recent med list.                   Brand Name Dispense Instructions for use    ADDERALL PO      Take 40 mg by mouth daily       ammonium lactate 12 % lotion    LAC-HYDRIN    500 g    Apply topically 2 times daily       ARIPiprazole 5 MG tablet    ABILIFY     Take 5 mg by mouth       B COMPLEX-C-FOLIC ACID PO          BREO ELLIPTA  200-25 MCG/INH oral inhaler   Generic drug:  fluticasone-vilanterol      Inhale 1 puff into the lungs daily       calcipotriene 0.005 % Oint     60 g    Apply with equal parts 5-fluorouracil cream twice daily to the arms and hands until a reaction develops (minimum of four days).       calcitRIOL 0.25 MCG capsule    ROCALTROL     Take 0.25 mcg by mouth daily       COMBIVENT RESPIMAT  MCG/ACT inhaler   Generic drug:  Ipratropium-Albuterol      Inhale 1 puff into the lungs 4 times daily       EFFEXOR XR PO      Take  by mouth. 300 mg       fluorouracil 5 % cream    EFUDEX    40 g    Apply with equal parts calcipotriene ointment twice daily to the arms and hands until a reaction develops (minimum of four days).       furosemide 20 MG tablet    LASIX     Take 20 mg by mouth daily Take 20 mg twice daily       HYDROcodone-acetaminophen 5-325 MG per tablet    NORCO    5 tablet    Take 1 tablet by mouth every 6 hours as needed for pain       insulin detemir 100 UNIT/ML injection    LEVEMIR     Inject Subcutaneous At Bedtime       mupirocin 2 % ointment    BACTROBAN    22 g    Use 1 to 2 times a day to affected area.       niacinamide 500 MG tablet     60 tablet    Take 1 tablet (500 mg) by mouth 2 times daily (with meals)       potassium 75 MG Tabs          predniSONE 5 MG tablet    DELTASONE    0    1 TABLET DAILY       propranolol 10 MG tablet    INDERAL     TAKE 1 TABLET BY MOUTH TWICE A DAY FOR HAND TREMOR       PROTONIX 40 MG EC tablet   Generic drug:  pantoprazole     30    1 TABLET DAILY       PROZAC PO      Take 40 mg by mouth       SIMVASTATIN PO          sirolimus Tabs tablet     30 tablet    Take 1 tablet by mouth daily.       TEMAZEPAM PO      15 mg.       traZODone 50 MG tablet    DESYREL    60    1-2 pills at night as needed       VISTARIL PO          VITAMIN D PO

## 2017-06-29 ENCOUNTER — OFFICE VISIT (OUTPATIENT)
Dept: DERMATOLOGY | Facility: CLINIC | Age: 57
End: 2017-06-29

## 2017-06-29 DIAGNOSIS — D48.5 NEOPLASM OF UNCERTAIN BEHAVIOR OF SKIN: Primary | ICD-10-CM

## 2017-06-29 PROCEDURE — 88305 TISSUE EXAM BY PATHOLOGIST: CPT | Performed by: DERMATOLOGY

## 2017-06-29 ASSESSMENT — PAIN SCALES - GENERAL
PAINLEVEL: NO PAIN (0)
PAINLEVEL: NO PAIN (0)

## 2017-06-29 NOTE — NURSING NOTE
Dermatology Rooming Note    Nanci Van's goals for this visit include:   Chief Complaint   Patient presents with     Derm Problem     Nanci comes to clinic today for a skin exam. States concern with a spot on her upper lip, left cheek, right temple, and cellulitis of the left leg.     Joana Ramirez, CMA

## 2017-06-29 NOTE — MR AVS SNAPSHOT
After Visit Summary   6/29/2017    Nanci Van    MRN: [id number]           Patient Information     Date Of Birth          1960        Visit Information        Provider Department      6/29/2017 10:45 AM Kit Caceres MD [facility] Dermatology        Care Instructions    D[facility]ists: [phone]    Wound Care After a Biopsy    What is a skin biopsy?  A skin biopsy allows the doctor to examine a very small piece of tissue under the microscope to determine the diagnosis and the best treatment for the skin condition. A local anesthetic (numbing medicine)  is injected with a very small needle into the skin area to be tested. A small piece of skin is taken from the area. Sometimes a suture (stitch) is used.     What are the risks of a skin biopsy?  I will experience scar, bleeding, swelling, pain, crusting and redness. I may experience incomplete removal or recurrence. Risks of this procedure are excessive bleeding, bruising, infection, nerve damage, numbness, thick (hypertrophic or keloidal) scar and non-diagnostic biopsy.    How should I care for my wound for the first 24 hours?    Keep the wound dry and covered for 24 hours    If it bleeds, hold direct pressure on the area for 15 minutes. If bleeding does not stop then go to the emergency room    Avoid strenuous exercise the first 1-2 days or as your doctor instructs you    How should I care for the wound after 24 hours?    After 24 hours, remove the bandage    You may bathe or shower as normal    If you had a scalp biopsy, you can shampoo as usual and can use shower water to clean the biopsy site daily    Clean the wound twice a day with gentle soap and water    Do not scrub, be gentle    Apply white petroleum/Vaseline after cleaning the wound with a cotton swab or a clean finger, and keep the site covered with a Bandaid /bandage. Bandages are not necessary with a scalp biopsy    If you are unable to cover the site with a  Bandaid /bandage, re-apply ointment 2-3 times a day to keep the site moist. Moisture will help with healing    Avoid strenuous activity for first 1-2 days    Avoid lakes, rivers, pools, and oceans until the stitches are removed or the site is healed    How do I clean my wound?    Wash hands thoroughly with soap or use hand  before all wound care    Clean the wound with gentle soap and water    Apply white petroleum/Vaseline  to wound after it is clean    Replace the Bandaid /bandage to keep the wound covered for the first few days or as instructed by your doctor    If you had a scalp biopsy, warm shower water to the area on a daily basis should suffice    What should I use to clean my wound?     Cotton-tipped applicators (Qtips )    White petroleum jelly (Vaseline ). Use a clean new container and use Q-tips to apply.    Bandaids   as needed    Gentle soap     How should I care for my wound long term?    Do not get your wound dirty    Keep up with wound care for one week or until the area is healed.    A small scab will form and fall off by itself when the area is completely healed. The area will be red and will become pink in color as it heals. Sun protection is very important for how your scar will turn out. Sunscreen with an SPF 30 or greater is recommended once the area is healed.    You should have some soreness but it should be mild and slowly go away over several days. Talk to your doctor about using tylenol for pain,    When should I call my doctor?  If you have increased:     Pain or swelling    Pus or drainage (clear or slightly yellow drainage is ok)    Temperature over 100F    Spreading redness or warmth around wound    When will I hear about my results?  The biopsy results can take 2-3 weeks to come back. The clinic will call you with the results, send you a Lux[employer] message, or have you schedule a follow-up clinic or phone time to discuss the results. Contact our clinics if you do not hear from  us in 3 weeks.     Who should I call with questions?    [facility]: [phone]     [facility]: [phone]    For urgent needs outside of business hours call the [facility] at [phone] and ask for the dermatology resident on call            Follow-ups after your visit        Who to contact     Please call your clinic at [phone] to:    Ask questions about your health    Make or cancel appointments    Discuss your medicines    Learn about your test results    Speak to your doctor   If you have compliments or concerns about an experience at your clinic, or if you wish to file a complaint, please contact [facility] Patient Relations at [phone] or email us at [email or web address]         Additional Information About Your Visit        [employer] Information     [employer] gives you secure access to your electronic health record. If you see a primary care provider, you can also send messages to your care team and make appointments. If you have questions, please call your primary care clinic.  If you do not have a primary care provider, please call [phone] and they will assist you.      [employer] is an electronic gateway that provides easy, online access to your medical records. With [employer], you can request a clinic appointment, read your test results, renew a prescription or communicate with your care team.     To access your existing account, please contact your [facility] or call [phone] for assistance.        Care EveryWhere ID     This is your Care EveryWhere ID. This could be used by other organizations to access your [facility] medical records  [id number]         Blood Pressure from Last 3 Encounters:   05/23/17 147/85   06/15/16 170/81   08/07/14 136/76    Weight from Last 3 Encounters:   11/29/12 77.6 kg (171 lb)   01/12/10 76.9 kg (169 lb 8 oz)   10/20/08  78.9 kg (174 lb)              Today, you had the following     No orders found for display       Primary Care Provider Office Phone # Fax #    Keturah Mckeon [phone]       [facility] [address]  Health system [address]        Equal Access to Services     BELINDA BLAIR : Hadii aad ku hadasho Soomaali, waaxda luqadaha, qaybta kaalmada adeegyada, waxay idiin hayaan adeeg khashleysh ladali . So [facility] [phone].    ATENCIÓN: Si habla español, tiene a cabral disposición servicios gratuitos de asistencia lingüística. Dm al [phone].    We comply with applicable federal civil rights laws and [place] laws. We do not discriminate on the basis of race, color, national origin, age, disability sex, sexual orientation or gender identity.            Thank you!     Thank you for choosing [facility] DERMATOLOGY  for your care. Our goal is always to provide you with excellent care. Hearing back from our patients is one way we can continue to improve our services. Please take a few minutes to complete the written survey that you may receive in the mail after your visit with us. Thank you!             Your Updated Medication List - Protect others around you: Learn how to safely use, store and throw away your medicines at www.disposemymeds.org.          This list is accurate as of: 6/29/17 11:58 AM.  Always use your most recent med list.                   Brand Name Dispense Instructions for use Diagnosis    ADDERALL PO      Take 40 mg by mouth daily        ammonium lactate 12 % lotion    LAC-HYDRIN    500 g    Apply topically 2 times daily    Xerosis cutis       ARIPiprazole 5 MG tablet    ABILIFY     Take 5 mg by mouth        B COMPLEX-C-FOLIC ACID PO           BREO ELLIPTA 200-25 MCG/INH oral inhaler   Generic drug:  fluticasone-vilanterol      Inhale 1 puff into the lungs daily        calcipotriene 0.005 % Oint     60 g    Apply with equal parts 5-fluorouracil cream twice daily to  the arms and hands until a reaction develops (minimum of four days).    AK (actinic keratosis), Sun-damaged skin       calcitRIOL 0.25 MCG capsule    ROCALTROL     Take 0.25 mcg by mouth daily        COMBIVENT RESPIMAT  MCG/ACT inhaler   Generic drug:  Ipratropium-Albuterol      Inhale 1 puff into the lungs 4 times daily        EFFEXOR XR PO      Take  by mouth. 300 mg        fluorouracil 5 % cream    EFUDEX    40 g    Apply with equal parts calcipotriene ointment twice daily to the arms and hands until a reaction develops (minimum of four days).    AK (actinic keratosis), Sun-damaged skin       furosemide 20 MG tablet    LASIX     Take 20 mg by mouth daily Take 20 mg twice daily        HYDROcodone-acetaminophen 5-325 MG per tablet    NORCO    5 tablet    Take 1 tablet by mouth every 6 hours as needed for pain    Squamous cell carcinoma of skin of face       insulin detemir 100 UNIT/ML injection    LEVEMIR     Inject Subcutaneous At Bedtime        mupirocin 2 % ointment    BACTROBAN    22 g    Use 1 to 2 times a day to affected area.    Squamous cell carcinoma of left lower leg       niacinamide 500 MG tablet     60 tablet    Take 1 tablet (500 mg) by mouth 2 times daily (with meals)    History of nonmelanoma skin cancer       potassium 75 MG Tabs           predniSONE 5 MG tablet    DELTASONE    0    1 TABLET DAILY        propranolol 10 MG tablet    INDERAL     TAKE 1 TABLET BY MOUTH TWICE A DAY FOR HAND TREMOR        PROTONIX 40 MG EC tablet   Generic drug:  pantoprazole     30    1 TABLET DAILY    Esophageal reflux       PROZAC PO      Take 40 mg by mouth        SIMVASTATIN PO           sirolimus Tabs tablet     30 tablet    Take 1 tablet by mouth daily.    Kidney replaced by transplant       TEMAZEPAM PO      15 mg.        traZODone 50 MG tablet    DESYREL    60    1-2 pills at night as needed    Insomnia, unspecified       VISTARIL PO           VITAMIN D PO

## 2017-06-29 NOTE — LETTER
6/29/2017       RE: Nanci Van  [address]   [facility] [address]     Dear Colleague,    Thank you for referring your patient, Nanci Van, to the [facility] DERMATOLOGY at [facility]. Please see a copy of my visit note below.    [facility] Dermatology Note      DERMATOLOGY PROBLEM LIST  0. NUB x3, bx 6/29/17  - 1. L upper cutaneous lip, 2. L lower cheek, and 3. R dorsal forearm.  1. Hx renal transplantation 1987, on sirolimus/prednisone  - niacinamide 500 mg bid  2. NMSC, numerous, especially of her hands  - SCC, R cheek, s/p MMS 5/23/17  - BCC, R shoulder, excised 4/13/17  - SCC, L lower leg, excised 1/12/173.  - SCC, L anterior shin s/p MMS 6/15/16  3. Diffuse actinic keratoses,  - bx-proven  L medial hand, R 4th digit, bx  4/13/17  Q3-4moTBSEs (last 3/30/17)      Encounter Date: Jun 29, 2017    CC:   Chief Complaint   Patient presents with     Derm Problem     Nanci comes to clinic today for a skin exam. States concern with a spot on her upper lip, left cheek, right temple, and cellulitis of the left leg.         History of Present Illness:  Ms. Nanci Van is a 57 year old female returning for follow-up, requesting a focused exam of 3 suspicious lesions on her L upper cutaneous lip, her L lower cheek,and R dorsal forearm. All three are tender, and have been present for 1-2 months.    She has reacted 5-FU+calcipotriene on her arms, but not on her face, and has not used it longer than 2 consecutive days. For chemoprophylaxis, she is taking niacinamide, but only intermittently as the pill is difficult to swallow.    Past Medical History:   Patient Active Problem List   Diagnosis     Mixed hyperlipidemia     Kidney replaced by transplant     Essential hypertension     Other osteoporosis     Disorder of kidney and ureter     Esophageal reflux     Attention deficit disorder     Squamous cell carcinoma     Past Medical History:    Diagnosis Date     Abnormal glandular Papanicolaou smear of cervix      Esophageal reflux      Essential hypertension, benign      Kidney replaced by transplant 1987    f/b renal     Mitral valve disorder 2005    echo done, she needs SBE prophylaxis     Mixed hyperlipidemia      Other osteoporosis      Postmenopausal atrophic vaginitis     post-men.     Pyelonephritis, unspecified     had bilat nephrectomy      SQUAMOUS CELL CANCER 9/06    left leg     Squamous cell carcinoma      Urosepsis 12/06    Enterococcal urosepsis, hospitalized     Past Surgical History:   Procedure Laterality Date     BIOPSY OF SKIN LESION       C MAXILLARY SINUSOTOMY      surg twice      C NONSPECIFIC PROCEDURE  2001    cervical surgery x 2 for fractured C4-5     C TOTAL ABDOM HYSTERECTOMY  1996     for hemorrhaging, including BSO     C TRANSPLANT KIDNEY+RECIP NEPHREC  1983, 1987    first one from brother, rejected due to preg, 2nd transplant from cadaver     MOHS MICROGRAPHIC PROCEDURE       SURGICAL HISTORY OF -   9/06    removal squamous cell CA left leg       Medications:  Current Outpatient Prescriptions   Medication Sig Dispense Refill     ARIPiprazole (ABILIFY) 5 MG tablet Take 5 mg by mouth       potassium 75 MG TABS        propranolol (INDERAL) 10 MG tablet TAKE 1 TABLET BY MOUTH TWICE A DAY FOR HAND TREMOR       fluticasone-vilanterol (BREO ELLIPTA) 200-25 MCG/INH oral inhaler Inhale 1 puff into the lungs daily       Ipratropium-Albuterol (COMBIVENT RESPIMAT)  MCG/ACT inhaler Inhale 1 puff into the lungs 4 times daily       HYDROcodone-acetaminophen (NORCO) 5-325 MG per tablet Take 1 tablet by mouth every 6 hours as needed for pain 5 tablet 0     calcipotriene 0.005 % OINT Apply with equal parts 5-fluorouracil cream twice daily to the arms and hands until a reaction develops (minimum of four days). 60 g 1     fluorouracil (EFUDEX) 5 % cream Apply with equal parts calcipotriene ointment twice daily to the arms and hands  "until a reaction develops (minimum of four days). 40 g 1     niacinamide 500 MG tablet Take 1 tablet (500 mg) by mouth 2 times daily (with meals) 60 tablet 11     ammonium lactate (LAC-HYDRIN) 12 % lotion Apply topically 2 times daily 500 g 11     mupirocin (BACTROBAN) 2 % ointment Use 1 to 2 times a day to affected area. 22 g 3     insulin detemir (LEVEMIR) 100 UNIT/ML soln Inject Subcutaneous At Bedtime       HydrOXYzine Pamoate (VISTARIL PO)        Amphetamine-Dextroamphetamine (ADDERALL PO) Take 40 mg by mouth daily       FLUoxetine HCl (PROZAC PO) Take 40 mg by mouth       SIMVASTATIN PO        Cholecalciferol (VITAMIN D PO)        B COMPLEX-C-FOLIC ACID PO        calcitRIOL (ROCALTROL) 0.25 MCG capsule Take 0.25 mcg by mouth daily       Furosemide (LASIX) 20 MG tablet Take 20 mg by mouth daily Take 20 mg twice daily       RAPAMUNE 1 MG tablet Take 1 tablet by mouth daily. 30 tablet 2     Venlafaxine HCl (EFFEXOR XR PO) Take  by mouth. 300 mg       TEMAZEPAM PO 15 mg.        PROTONIX 40 MG OR TBEC 1 TABLET DAILY 30 0     TRAZODONE HCL 50 MG OR TABS 1-2 pills at night as needed 60 6     PREDNISONE 5 MG OR TABS 1 TABLET DAILY 0 0        Allergies   Allergen Reactions     Codeine      Erythromycin      \"violently ill\"     Esters      tigan     Fosamax      GI         Review of Systems:  General: No recent infections, fevers, chills, malaise, arthralgias, unexplained weight/appetite changes  Skin: No new/changing moles, nothing bleeding/nonhealing/painful/tender/rapidly-growing.  Lymphatic: No subcutaneous lumps/bumps.      Physical exam:  Vitals: There were no vitals taken for this visit.  GEN: Well-appearing female, no discomfort or distress, cooperative with the exam  SKIN: Focused face, arm exam  - R dorsal forearm: 2.1cm well marginated pink-red smooth patch  - L upper cutaneous lip: ill-defined, thin, mildly-hyperkeratotic arcuate noncontiguous, non-indurated mildly tender plaque, ~12x8mm  - L lower cheek: " 1cm bright pink subtly indurated plaque  -No other lesions of concern on areas examined.     Impression/Plan:  1. NUB x3 (1. L upper cutaneous lip, 2. L lower cheek, and 3. R dorsal forearm) all concerning for AK vs SCC vs SCCIS  - photos and shave biopsy x3 (see below)    2. Hx renal transplantation, diffuse actinic damage. Response to incomplete course of 5-FU cream+calcipotriene oint bid, but did not complete the minimum 4 days.   - advised holding treatment to the face until biopsy results, definitive treatment  - once complete, OK'd 5-FU alone bid x 2-4 weeks to all treatment areas, and using calcipotriene only a night, as it is too greasy).  - continue niacinamide 500 mg bid; advised asking pharmacy if smaller pills can be procured. May be a candidate for acitretin in the future.    Follow-up: Sep 2017    Discussed and examined with [facility] staff dermatologist Dr. Allison Driver.    Kit Caceres MD, JACINTA  PGY-4, Dermatology      Staff Involved:  Resident(Kit Caceres)/Staff(as above)  I was present for key portions of the procedures.  KB  .I, Allison Driver MD, saw this patient with the resident and agree with the resident s findings and plan of care as documented in the resident s note.

## 2017-06-29 NOTE — PATIENT INSTRUCTIONS
[facility]: [phone]    Wound Care After a Biopsy    What is a skin biopsy?  A skin biopsy allows the doctor to examine a very small piece of tissue under the microscope to determine the diagnosis and the best treatment for the skin condition. A local anesthetic (numbing medicine)  is injected with a very small needle into the skin area to be tested. A small piece of skin is taken from the area. Sometimes a suture (stitch) is used.     What are the risks of a skin biopsy?  I will experience scar, bleeding, swelling, pain, crusting and redness. I may experience incomplete removal or recurrence. Risks of this procedure are excessive bleeding, bruising, infection, nerve damage, numbness, thick (hypertrophic or keloidal) scar and non-diagnostic biopsy.    How should I care for my wound for the first 24 hours?    Keep the wound dry and covered for 24 hours    If it bleeds, hold direct pressure on the area for 15 minutes. If bleeding does not stop then go to the emergency room    Avoid strenuous exercise the first 1-2 days or as your doctor instructs you    How should I care for the wound after 24 hours?    After 24 hours, remove the bandage    You may bathe or shower as normal    If you had a scalp biopsy, you can shampoo as usual and can use shower water to clean the biopsy site daily    Clean the wound twice a day with gentle soap and water    Do not scrub, be gentle    Apply white petroleum/Vaseline after cleaning the wound with a cotton swab or a clean finger, and keep the site covered with a Bandaid /bandage. Bandages are not necessary with a scalp biopsy    If you are unable to cover the site with a Bandaid /bandage, re-apply ointment 2-3 times a day to keep the site moist. Moisture will help with healing    Avoid strenuous activity for first 1-2 days    Avoid lakes, rivers, pools, and oceans until the stitches are removed or the site is healed    How do I clean my wound?    Wash hands thoroughly  with soap or use hand  before all wound care    Clean the wound with gentle soap and water    Apply white petroleum/Vaseline  to wound after it is clean    Replace the Bandaid /bandage to keep the wound covered for the first few days or as instructed by your doctor    If you had a scalp biopsy, warm shower water to the area on a daily basis should suffice    What should I use to clean my wound?     Cotton-tipped applicators (Qtips )    White petroleum jelly (Vaseline ). Use a clean new container and use Q-tips to apply.    Bandaids   as needed    Gentle soap     How should I care for my wound long term?    Do not get your wound dirty    Keep up with wound care for one week or until the area is healed.    A small scab will form and fall off by itself when the area is completely healed. The area will be red and will become pink in color as it heals. Sun protection is very important for how your scar will turn out. Sunscreen with an SPF 30 or greater is recommended once the area is healed.    You should have some soreness but it should be mild and slowly go away over several days. Talk to your doctor about using tylenol for pain,    When should I call my doctor?  If you have increased:     Pain or swelling    Pus or drainage (clear or slightly yellow drainage is ok)    Temperature over 100F    Spreading redness or warmth around wound    When will I hear about my results?  The biopsy results can take 2-3 weeks to come back. The clinic will call you with the results, send you a mycIntelligentMDxt message, or have you schedule a follow-up clinic or phone time to discuss the results. Contact our clinics if you do not hear from us in 3 weeks.     Who should I call with questions?    [facility]: [phone]     [facility]: [phone]    For urgent needs outside of business hours call the [facility] [[email or web address]] at [phone] and ask for the dermatology resident on  call

## 2017-06-30 LAB — COPATH REPORT: NORMAL

## 2017-06-30 RX ORDER — LIDOCAINE HYDROCHLORIDE AND EPINEPHRINE 10; 10 MG/ML; UG/ML
3 INJECTION, SOLUTION INFILTRATION; PERINEURAL ONCE
Qty: 3 ML | Refills: 0 | OUTPATIENT
Start: 2017-06-30 | End: 2017-06-30

## 2017-06-30 NOTE — PROGRESS NOTES
[facility] Dermatology Note      DERMATOLOGY PROBLEM LIST  0. NUB x3, bx 6/29/17  - 1. L upper cutaneous lip, 2. L lower cheek, and 3. R dorsal forearm.  1. Hx renal transplantation 1987, on sirolimus/prednisone  - niacinamide 500 mg bid  2. NMSC, numerous, especially of her hands  - SCC, R cheek, s/p MMS 5/23/17  - BCC, R shoulder, excised 4/13/17  - SCC, L lower leg, excised 1/12/173.  - SCC, L anterior shin s/p MMS 6/15/16  3. Diffuse actinic keratoses,  - bx-proven  L medial hand, R 4th digit, bx  4/13/17  Q3-4moTBSEs (last 3/30/17)      Encounter Date: Jun 29, 2017    CC:   Chief Complaint   Patient presents with     Derm Problem     Nanci comes to clinic today for a skin exam. States concern with a spot on her upper lip, left cheek, right temple, and cellulitis of the left leg.         History of Present Illness:  Ms. Nanci Van is a 57 year old female returning for follow-up, requesting a focused exam of 3 suspicious lesions on her L upper cutaneous lip, her L lower cheek,and R dorsal forearm. All three are tender, and have been present for 1-2 months.    She has reacted 5-FU+calcipotriene on her arms, but not on her face, and has not used it longer than 2 consecutive days. For chemoprophylaxis, she is taking niacinamide, but only intermittently as the pill is difficult to swallow.    Past Medical History:   Patient Active Problem List   Diagnosis     Mixed hyperlipidemia     Kidney replaced by transplant     Essential hypertension     Other osteoporosis     Disorder of kidney and ureter     Esophageal reflux     Attention deficit disorder     Squamous cell carcinoma     Past Medical History:   Diagnosis Date     Abnormal glandular Papanicolaou smear of cervix      Esophageal reflux      Essential hypertension, benign      Kidney replaced by transplant 1987    f/b renal     Mitral valve disorder 2005    echo done, she needs SBE prophylaxis     Mixed hyperlipidemia      Other  osteoporosis      Postmenopausal atrophic vaginitis     post-men.     Pyelonephritis, unspecified     had bilat nephrectomy      SQUAMOUS CELL CANCER 9/06    left leg     Squamous cell carcinoma      Urosepsis 12/06    Enterococcal urosepsis, hospitalized     Past Surgical History:   Procedure Laterality Date     BIOPSY OF SKIN LESION       C MAXILLARY SINUSOTOMY      surg twice      C NONSPECIFIC PROCEDURE  2001    cervical surgery x 2 for fractured C4-5     C TOTAL ABDOM HYSTERECTOMY  1996     for hemorrhaging, including BSO     C TRANSPLANT KIDNEY+RECIP NEPHREC  1983, 1987    first one from brother, rejected due to preg, 2nd transplant from cadaver     MOHS MICROGRAPHIC PROCEDURE       SURGICAL HISTORY OF -   9/06    removal squamous cell CA left leg       Medications:  Current Outpatient Prescriptions   Medication Sig Dispense Refill     ARIPiprazole (ABILIFY) 5 MG tablet Take 5 mg by mouth       potassium 75 MG TABS        propranolol (INDERAL) 10 MG tablet TAKE 1 TABLET BY MOUTH TWICE A DAY FOR HAND TREMOR       fluticasone-vilanterol (BREO ELLIPTA) 200-25 MCG/INH oral inhaler Inhale 1 puff into the lungs daily       Ipratropium-Albuterol (COMBIVENT RESPIMAT)  MCG/ACT inhaler Inhale 1 puff into the lungs 4 times daily       HYDROcodone-acetaminophen (NORCO) 5-325 MG per tablet Take 1 tablet by mouth every 6 hours as needed for pain 5 tablet 0     calcipotriene 0.005 % OINT Apply with equal parts 5-fluorouracil cream twice daily to the arms and hands until a reaction develops (minimum of four days). 60 g 1     fluorouracil (EFUDEX) 5 % cream Apply with equal parts calcipotriene ointment twice daily to the arms and hands until a reaction develops (minimum of four days). 40 g 1     niacinamide 500 MG tablet Take 1 tablet (500 mg) by mouth 2 times daily (with meals) 60 tablet 11     ammonium lactate (LAC-HYDRIN) 12 % lotion Apply topically 2 times daily 500 g 11     mupirocin (BACTROBAN) 2 % ointment Use 1  "to 2 times a day to affected area. 22 g 3     insulin detemir (LEVEMIR) 100 UNIT/ML soln Inject Subcutaneous At Bedtime       HydrOXYzine Pamoate (VISTARIL PO)        Amphetamine-Dextroamphetamine (ADDERALL PO) Take 40 mg by mouth daily       FLUoxetine HCl (PROZAC PO) Take 40 mg by mouth       SIMVASTATIN PO        Cholecalciferol (VITAMIN D PO)        B COMPLEX-C-FOLIC ACID PO        calcitRIOL (ROCALTROL) 0.25 MCG capsule Take 0.25 mcg by mouth daily       Furosemide (LASIX) 20 MG tablet Take 20 mg by mouth daily Take 20 mg twice daily       RAPAMUNE 1 MG tablet Take 1 tablet by mouth daily. 30 tablet 2     Venlafaxine HCl (EFFEXOR XR PO) Take  by mouth. 300 mg       TEMAZEPAM PO 15 mg.        PROTONIX 40 MG OR TBEC 1 TABLET DAILY 30 0     TRAZODONE HCL 50 MG OR TABS 1-2 pills at night as needed 60 6     PREDNISONE 5 MG OR TABS 1 TABLET DAILY 0 0        Allergies   Allergen Reactions     Codeine      Erythromycin      \"violently ill\"     Esters      tigan     Fosamax      GI         Review of Systems:  General: No recent infections, fevers, chills, malaise, arthralgias, unexplained weight/appetite changes  Skin: No new/changing moles, nothing bleeding/nonhealing/painful/tender/rapidly-growing.  Lymphatic: No subcutaneous lumps/bumps.      Physical exam:  Vitals: There were no vitals taken for this visit.  GEN: Well-appearing female, no discomfort or distress, cooperative with the exam  SKIN: Focused face, arm exam  - R dorsal forearm: 2.1cm well marginated pink-red smooth patch  - L upper cutaneous lip: ill-defined, thin, mildly-hyperkeratotic arcuate noncontiguous, non-indurated mildly tender plaque, ~12x8mm  - L lower cheek: 1cm bright pink subtly indurated plaque  -No other lesions of concern on areas examined.     Impression/Plan:  1. NUB x3 (1. L upper cutaneous lip, 2. L lower cheek, and 3. R dorsal forearm) all concerning for AK vs SCC vs SCCIS  - photos and shave biopsy x3 (see below)    2. Hx renal " transplantation, diffuse actinic damage. Response to incomplete course of 5-FU cream+calcipotriene oint bid, but did not complete the minimum 4 days.   - advised holding treatment to the face until biopsy results, definitive treatment  - once complete, OK'd 5-FU alone bid x 2-4 weeks to all treatment areas, and using calcipotriene only a night, as it is too greasy).  - continue niacinamide 500 mg bid; advised asking pharmacy if smaller pills can be procured. May be a candidate for acitretin in the future.    Follow-up: Sep 2017    Discussed and examined with [facility] staff dermatologist Dr. Allison Driver.    Kit Caceres MD, JACINTA  PGY-4, Dermatology      Staff Involved:  Resident(Kit Caceres)/Staff(as above)  I was present for key portions of the procedures.  KB  .I, Allison Driver MD, saw this patient with the resident and agree with the resident s findings and plan of care as documented in the resident s note.

## 2017-07-03 DIAGNOSIS — C44.320 SCC (SQUAMOUS CELL CARCINOMA), FACE: Primary | ICD-10-CM

## 2017-07-14 ENCOUNTER — TELEPHONE (OUTPATIENT)
Dept: DERMATOLOGY | Facility: CLINIC | Age: 57
End: 2017-07-14

## 2017-07-14 NOTE — TELEPHONE ENCOUNTER
Called to schedule Mohs surgery for SCC on left cheek; Dr. Driver/ Dr. Caceres referring. [facility] with direct line for scheduling.

## 2017-07-19 ENCOUNTER — TELEPHONE (OUTPATIENT)
Dept: DERMATOLOGY | Facility: CLINIC | Age: 57
End: 2017-07-19

## 2017-07-19 NOTE — TELEPHONE ENCOUNTER
History of Skin cancer : yes    History of Mohs Surgery: yes    History of a solid organ transplant: yes Organ(s): kidney Year(s): 28 Creatinine:  Bone Marrow Transplant : no (year, )    Immunosuppressive Medications: yes (if yes, which ones: rapmune, prednisone)    HIV or Hepatitis B or C : no    Chronic lymphocytic leukemia: no    Diabetes: yes (Type 1 or 2: 2)    Any Bleeding disorders: no    Do you have a Pacemaker or Defibrillator: no (year placed: )    Artificial heart valve: no (mechanical or porcine: )    Joint Replacement in the last 2 years: no Joint(s):  Year(s):     Do you typically take Prophylactic Antibiotics before seeing a dentist or having a procedure?: no but is known for infections and requested antibiotics    If yes, verify that patient has the antibiotic on hand and instruct to take one hour before their surgery appointment.    If they do not have the antibiotic, verify the patients pharmacy and notify Dr. Burrell by printing the pre-mohs call sheet.    Do you wear a C Pap Mask: no    If yes, and procedure is on the face, ask patient to bring in the mask with them (Mask only)    Do you have any mobility issues: yes    If yes, is a van service is required to transport you to/from your appointment?     Smoking History (tobacco of any sort): no    Do you have any other health issues that we should know about? Yes asthma    Do you take any of the following Blood Thinners:    Aspirin: 81mg    Plavix/Aggrastat/Brilinta: no    Warfarin: no (Last INR:  Date: )    Pradaxa/Eliquis/Xarelto: no    Ibuprofen (Advil/Motrin): no    Naproxen (Aleve): no    Vitamin E: no    Fish Oil: no    Ginkgo biloba: no    Other:    donePaient was instructed of the following: Ibuprofen, naproxen, Vitamin E, Fish Oil, and Ginkgo biloba should be stopped 1 week prior to and 1 week after the procedure.    Medication Allergies: codeine, erythromycin, levaquin, tigan, fosamax.    Are medications in Epic: yes    donePatient was  reminded to take all medications as usual, other than those listed above, on the day of surgery    donePatient was instructed to bring all medications to clinic on day of surgery    donePatient will bring a     (A  is helpful for the following reasons: some patients need medications to relax, but once given, patients should not drive; sometimes bandages obstruct your vision making it difficult to see and unsafe to drive; the day can get long so it s nice to have a soila; sometimes the procedure wears people out/makes them quite tired)    donePhotograph of the surgical site(s) is/are available    donePatient was reminded that this can be an all-day procedure and that no other appointments should be scheduled on the day of Mohs

## 2017-07-24 ENCOUNTER — OFFICE VISIT (OUTPATIENT)
Dept: DERMATOLOGY | Facility: CLINIC | Age: 57
End: 2017-07-24

## 2017-07-24 VITALS — DIASTOLIC BLOOD PRESSURE: 60 MMHG | SYSTOLIC BLOOD PRESSURE: 116 MMHG | HEART RATE: 75 BPM

## 2017-07-24 DIAGNOSIS — D04.39 SQUAMOUS CELL CARCINOMA IN SITU OF SKIN OF CHEEK: Primary | ICD-10-CM

## 2017-07-24 DIAGNOSIS — R52 PAIN: ICD-10-CM

## 2017-07-24 RX ORDER — HYDROCODONE BITARTRATE AND ACETAMINOPHEN 5; 325 MG/1; MG/1
1 TABLET ORAL EVERY 4 HOURS PRN
Qty: 5 TABLET | Refills: 0 | Status: SHIPPED | OUTPATIENT
Start: 2017-07-24

## 2017-07-24 ASSESSMENT — PAIN SCALES - GENERAL
PAINLEVEL: MILD PAIN (2)
PAINLEVEL: SEVERE PAIN (6)

## 2017-07-24 NOTE — NURSING NOTE
2nd layer performed on the left lower cheek. Injected 3.0ml of Xylocaine  (Lidocaine 1.5% and Epinephrine  (1:200,000)      Present for procedure:  Dr. Ashanti Kunz Resident MD Henrietta Dixon, [facility]

## 2017-07-24 NOTE — PATIENT INSTRUCTIONS
Sutured Wound Care  Caring for your skin after surgery:    After your surgery, a pressure bandage will be placed over the area that has stitches. This will prevent bleeding. Please follow these instructions over the next 1 to 2 weeks. Following this regimen will help to prevent complications as your wound heals.      No strenuous activity for 48 hours. Resume moderate activity in 48 hours. No heavy exercising until you are seen for follow up.    Take Tylenol one hour after surgery. Take Tylenol every 4 hours as needed and do not take any aspirin products for pain (continue taking aspirin if prescribed by your doctor to prevent heart attacks and strokes).    Do not drink alcoholic beverages for 48 hours.    No dietary restrictions.    Keep the pressure bandage in place for 24 hours. If the bandage becomes blood tinged or loose, reinforce it with gauze and tape.     Keep the bandage dry. Wash around it carefully    If the tape becomes soiled or starts to come off, reinforce it with additional paper tape.    Do not smoke for 3 weeks; smoking is detrimental to wound healing.    It is normal to have swelling and bruising around the surgical site. The bruising will fade in approximately 10-14 days. Elevate the area to reduce swelling.    Numbness, itchiness and sensitivity to temperature changes can occur after surgery and may take up to 18 months to normalize.  Remove the outer pressure bandage in 24 hours. Leave the flat bandage in place for 10 days or until your follow-up appointment if it is within those 10 days.     Bleeding:  You may see a small amount of drainage or blood on your pressure dressing. This is normal and the following instructions should be followed if the wound is bleeding saturates the dressing.    1. Leave the bandage in place.  2. Use tightly rolled up gauze or a cloth to apply direct pressure over the bandage for 20 minutes.  3. Reapply pressure for an additional 20 minutes if necessary.  4. Call  the office or go to the nearest emergency room if pressure fails to stop the bleeding.  5. Use additional gauze and tape to maintain pressure once the bleeding has stopped.    Pain:  1. Post operative pain should slowly get better, never worse.  2. A severe increase in pain may indicate a problem. Call the office if this occurs.  3. You may use ice directly over the dressing, but make sure the dressing does not get wet.    Phone numbers:  During business hours (M-F 8:00-4:30 p.m.)  [facility]-  [phone] Option 1 appt. desk  [phone]  Option 3 nurse triage line  ---------------------------------------------------------  Evenings/Weekends/Holidays  Hospital - [phone]   TTY for hearing qirsnucv-[phone]  *Ask  to page dermatologist on-call  Emergency Rdzl-[phone]  TTY for hearing impaired- [phone]

## 2017-07-24 NOTE — NURSING NOTE
Steri strips, mastisol, and paper tape were used. Gauze and paper tape were used  For pressure. Instructions for care were printed and verbally gone over with patient. Patient questions were answered. Patient has very mild pain.    Socorro Paniagua CMA

## 2017-07-24 NOTE — LETTER
7/24/2017       RE: Nanci Van  [address]   [facility] [address]     Dear Colleague,    Thank you for referring your patient, Nanci Van, to the [facility] DERMATOLOGIC SURGERY at [facility]. Please see a copy of my visit note below.    [facility] Mohs Micrographic Surgery Note:  Jul 24, 2017  Nanci Van is a 57 year old female who was referred by Dr. 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 The lesion was excised using Mohs micrographic surgery in 2 stage(s), and the defect repaired by linear closure. Please refer to the operative report(s).     Given Norco 5/325 mg, 1 tab PO Q4 hours PRN pain, #5.     The patient was asked to return in 1 week(s) for bandage change/wound check.     Follow up with referring physician/provider for history of skin cancer.    Thank you for the opportunity to see and treat your patient. Please do not hesitate to contact me with any questions or concerns regarding the findings and/or operation.    I, Fredrick Joya, am serving as a scribe to document services personally performed by Dr. Tiana Burrell M.D., based on data collection and the provider's statements to me.     Provider Disclosure:   I have reviewed the content of the documentation above and have edited it as needed. I have personally performed the services documented herein and the documentation accurately represents those services provided and the decisions that I have made.       Tiana Burrell MD  , Department of Dermatology  Director, Dermatologic Surgery & Laser Center  Phone: [phone]; Fax: [phone]  [email or web address]   [address] [facility]   Mail Code [id number]   [place] [address]         MOHS MICROGRAPHIC SURGERY REPORT   Jul 24, 2017    Surgeon: Tiana Burrell MD  Fellow:     Resident:     INDICATION:    Preoperative Diagnosis: primary squamous cell carcinoma  Location: left lower cheek  Postoperative Diagnosis: Same  Preoperative Lesion size: 0.8 x 1.1 cm    After appropriate discussion and informed consent for Mohs surgery and possible repair of the Mohs surgery defect, the patient underwent Mohs surgery as follows:      STAGE I:  The patient was placed on the operating room table.  The area was cleansed with chlorhexidine and infiltrated with 1% Lidocaine and epinephrine. Tumor was debulked. Using a #15-blade, complete excision was made around the tumor in 1 section.  Hemostasis was obtained by electrodesiccation.  A dressing was placed.  Tissue was divided into 1 tissue block(s) that were subsequently mapped, color coded and processed in the Mohs Laboratory.  Microscopic tumor (SCCIS) was  found in 1 of the tissue blocks.    STAGE II: The patient returned to the operating room.  By reference to the Mohs map, the area of positivity was delineated, infiltrated with the anesthetic mixture described above and excised in 1 section. Tissue was divided into 1 tissue block(s) that were again mapped, color coded and processed in the Mohs Laboratory.  Hemostasis was obtained in the usual manner and a dressing placed.  Microscopic tumor was not found in the tissue block.      With the lesion clear of micrographic tumor, surgery was considered complete.  The defect extended to the subcutis and measured 1.4 x 1.7 cm.    RECONSTRUCTIVE DERMATOLOGIC SURGERY REPORT  We discussed the options for wound management in full with the patient including risks/benefits/possible outcomes.     Complex Layered Linear Closure:  Because of the size and full thickness nature of the defect and tightness of the surrounding skin, a complex closure was planned.  Patient was prepped with Betasept,  local anesthesia administered, and draped in a sterile fashion. The Mohs defect/wound was circumferentially debeveled.  Burow s triangles were excised in the relaxed skin tension lines from opposite ends of the defect, oriented tangentially and the wound edges were widely undermined by dissection in the subcutaneous plane until adequate tissue mobility had been obtained and tissue could be cosmetically re-draped.  Hemostasis was obtained.  The wound edges were then advanced and closed in a layered fashion.  Postoperative length was 4.8 cm.      Estimated blood loss, minimal; complications, none; bandaging and wound care, routine. Patient was discharged in good condition and will return for bandage change in 1 week.      IFredrick, am serving as a scribe to document services personally performed by Dr. Tiana Burrell M.D., based on data collection and the provider's statements to me.     Provider Disclosure:   I have reviewed the content of the documentation above and have edited it as needed. I have personally performed the services documented herein and the documentation accurately represents those services provided and the decisions that I have made.       Tiana Burrell MD  , Department of Dermatology  Director, Dermatologic Surgery & Laser Center  Phone: [phone]; Fax: [phone]  [email or web address] Dermatologic Surgery [facility] Laser [facility]   [address] [place] [address]   Mail [address] [address]

## 2017-07-24 NOTE — NURSING NOTE
Chief Complaint   Patient presents with     Skin Cancer     Nanci is here for mohs on her left lower cheek SCC- Requesting pain medication for after and antibiotics     Socorro Paniagua CMA

## 2017-07-24 NOTE — NURSING NOTE
closure layer performed on the left lower cheek. Injected 6.0ml of Xylocaine  (Lidocaine 1% and Epinephrine  (1:100,000))      Present for procedure:Dr. Burrell and Socorro Paniagua CMA

## 2017-07-24 NOTE — NURSING NOTE
1st layer performed on the left lower cheek. Injected 6.0ml of Xylocaine  (Lidocaine 1.5% and Epinephrine  (1:200,000))      Present for procedure: Dr. Burrell, Socorro Paniagua CMA and Justin Blair MD

## 2017-07-24 NOTE — MR AVS SNAPSHOT
After Visit Summary   7/24/2017    Nanci Van    MRN: [id number]           Patient Information     Date Of Birth          1960        Visit Information        Provider Department      7/24/2017 8:30 AM Tiana Burrell MD [facility] Dermatologic Surgery        Today's Diagnoses     Pain    -  1      Care Instructions    Sutured Wound Care  Caring for your skin after surgery:    After your surgery, a pressure bandage will be placed over the area that has stitches. This will prevent bleeding. Please follow these instructions over the next 1 to 2 weeks. Following this regimen will help to prevent complications as your wound heals.      No strenuous activity for 48 hours. Resume moderate activity in 48 hours. No heavy exercising until you are seen for follow up.    Take Tylenol one hour after surgery. Take Tylenol every 4 hours as needed and do not take any aspirin products for pain (continue taking aspirin if prescribed by your doctor to prevent heart attacks and strokes).    Do not drink alcoholic beverages for 48 hours.    No dietary restrictions.    Keep the pressure bandage in place for 24 hours. If the bandage becomes blood tinged or loose, reinforce it with gauze and tape.     Keep the bandage dry. Wash around it carefully    If the tape becomes soiled or starts to come off, reinforce it with additional paper tape.    Do not smoke for 3 weeks; smoking is detrimental to wound healing.    It is normal to have swelling and bruising around the surgical site. The bruising will fade in approximately 10-14 days. Elevate the area to reduce swelling.    Numbness, itchiness and sensitivity to temperature changes can occur after surgery and may take up to 18 months to normalize.  Remove the outer pressure bandage in 24 hours. Leave the flat bandage in place for 10 days or until your follow-up appointment if it is within those 10 days.     Bleeding:  You may see a small amount of drainage or  blood on your pressure dressing. This is normal and the following instructions should be followed if the wound is bleeding saturates the dressing.    1. Leave the bandage in place.  2. Use tightly rolled up gauze or a cloth to apply direct pressure over the bandage for 20 minutes.  3. Reapply pressure for an additional 20 minutes if necessary.  4. Call the office or go to the nearest emergency room if pressure fails to stop the bleeding.  5. Use additional gauze and tape to maintain pressure once the bleeding has stopped.    Pain:  1. Post operative pain should slowly get better, never worse.  2. A severe increase in pain may indicate a problem. Call the office if this occurs.  3. You may use ice directly over the dressing, but make sure the dressing does not get wet.    Phone numbers:  During business hours (M-F 8:00-4:30 p.m.)  Dermatologic Surgery and Laser Center-  [phone] Option 1 appt. desk  [phone]  Option 3 nurse triage line  ---------------------------------------------------------  Evenings/Weekends/Holidays  Hospital - [phone]   TTY for hearing xcmgqqkw-[phone]  *Ask  to page dermatologist on-call  Emergency Wymy-[phone]  TTY for hearing impaired- [phone]            Follow-ups after your visit        Your next 10 appointments already scheduled     Jul 31, 2017  3:00 PM CDT   (Arrive by 2:45 PM)   Return Visit with Tiana Burrell MD   [facility] Dermatologic Surgery ([facility])    [address] [address] [phone]              Who to contact     Please call your clinic at [phone] to:    Ask questions about your health    Make or cancel appointments    Discuss your medicines    Learn about your test results    Speak to your doctor   If you have compliments or concerns about an experience at your clinic, or if you wish to file a complaint, please contact [facility]  Patient Relations at [phone] or email us at [email or web address]         Additional Information About Your Visit        [employer] Information     [employer] gives you secure access to your electronic health record. If you see a primary care provider, you can also send messages to your care team and make appointments. If you have questions, please call your primary care clinic.  If you do not have a primary care provider, please call [phone] and they will assist you.      [employer] is an electronic gateway that provides easy, online access to your medical records. With [employer], you can request a clinic appointment, read your test results, renew a prescription or communicate with your care team.     To access your existing account, please contact your [facility] or call [phone] for assistance.        Care EveryWhere ID     This is your Care EveryWhere ID. This could be used by other organizations to access your Mobile medical records  [id number]        Your Vitals Were     Pulse                   75            Blood Pressure from Last 3 Encounters:   07/24/17 116/60   05/23/17 147/85   06/15/16 170/81    Weight from Last 3 Encounters:   11/29/12 77.6 kg (171 lb)   01/12/10 76.9 kg (169 lb 8 oz)   10/20/08 78.9 kg (174 lb)              Today, you had the following     No orders found for display         Today's Medication Changes          These changes are accurate as of: 7/24/17  3:16 PM.  If you have any questions, ask your nurse or doctor.               These medicines have changed or have updated prescriptions.        Dose/Directions    * HYDROcodone-acetaminophen 5-325 MG per tablet   Commonly known as:  NORCO   This may have changed:  Another medication with the same name was added. Make sure you understand how and when to take each.   Used for:  Squamous cell carcinoma of skin of face        Dose:  1 tablet   Take 1 tablet by mouth every 6 hours as needed  for pain   Quantity:  5 tablet   Refills:  0       * HYDROcodone-acetaminophen 5-325 MG per tablet   Commonly known as:  NORCO   This may have changed:  You were already taking a medication with the same name, and this prescription was added. Make sure you understand how and when to take each.   Used for:  Pain        Dose:  1 tablet   Take 1 tablet by mouth every 4 hours as needed for moderate to severe pain   Quantity:  5 tablet   Refills:  0       * Notice:  This list has 2 medication(s) that are the same as other medications prescribed for you. Read the directions carefully, and ask your doctor or other care provider to review them with you.         Where to get your medicines      Some of these will need a paper prescription and others can be bought over the counter.  Ask your nurse if you have questions.     Bring a paper prescription for each of these medications     HYDROcodone-acetaminophen 5-325 MG per tablet                Primary Care Provider Office Phone # Fax #    Keturah Mckeon [phone]       [facility] [address]  [facility] [address]        Equal Access to Services     BELINDA BLAIR : Hadii peña ku hadasho Soomaali, waaxda luqadaha, qaybta kaalmada adeegyada, waxay kariin haychristina borrego . So [facility] [phone].    ATENCIÓN: Si habla español, tiene a cabral disposición servicios gratuitos de asistencia lingüística. Dm al [phone].    We comply with applicable federal civil rights laws and [place] laws. We do not discriminate on the basis of race, color, national origin, age, disability sex, sexual orientation or gender identity.            Thank you!     Thank you for choosing [facility] DERMATOLOGIC SURGERY  for your care. Our goal is always to provide you with excellent care. Hearing back from our patients is one way we can continue to improve our services. Please take a few minutes to complete the written survey that you may receive  in the mail after your visit with us. Thank you!             Your Updated Medication List - Protect others around you: Learn how to safely use, store and throw away your medicines at www.disposemymeds.org.          This list is accurate as of: 7/24/17  3:16 PM.  Always use your most recent med list.                   Brand Name Dispense Instructions for use Diagnosis    ADDERALL PO      Take 40 mg by mouth daily        ammonium lactate 12 % lotion    LAC-HYDRIN    500 g    Apply topically 2 times daily    Xerosis cutis       ARIPiprazole 5 MG tablet    ABILIFY     Take 5 mg by mouth        B COMPLEX-C-FOLIC ACID PO           BREO ELLIPTA 200-25 MCG/INH oral inhaler   Generic drug:  fluticasone-vilanterol      Inhale 1 puff into the lungs daily        calcipotriene 0.005 % Oint     60 g    Apply with equal parts 5-fluorouracil cream twice daily to the arms and hands until a reaction develops (minimum of four days).    AK (actinic keratosis), Sun-damaged skin       calcitRIOL 0.25 MCG capsule    ROCALTROL     Take 0.25 mcg by mouth daily        COMBIVENT RESPIMAT  MCG/ACT inhaler   Generic drug:  Ipratropium-Albuterol      Inhale 1 puff into the lungs 4 times daily        EFFEXOR XR PO      Take  by mouth. 300 mg        fluorouracil 5 % cream    EFUDEX    40 g    Apply with equal parts calcipotriene ointment twice daily to the arms and hands until a reaction develops (minimum of four days).    AK (actinic keratosis), Sun-damaged skin       furosemide 20 MG tablet    LASIX     Take 20 mg by mouth daily Take 20 mg twice daily        * HYDROcodone-acetaminophen 5-325 MG per tablet    NORCO    5 tablet    Take 1 tablet by mouth every 6 hours as needed for pain    Squamous cell carcinoma of skin of face       * HYDROcodone-acetaminophen 5-325 MG per tablet    NORCO    5 tablet    Take 1 tablet by mouth every 4 hours as needed for moderate to severe pain    Pain       insulin detemir 100 UNIT/ML injection    LEVEMIR      Inject Subcutaneous At Bedtime        mupirocin 2 % ointment    BACTROBAN    22 g    Use 1 to 2 times a day to affected area.    Squamous cell carcinoma of left lower leg       niacinamide 500 MG tablet     60 tablet    Take 1 tablet (500 mg) by mouth 2 times daily (with meals)    History of nonmelanoma skin cancer       potassium 75 MG Tabs           predniSONE 5 MG tablet    DELTASONE    0    1 TABLET DAILY        propranolol 10 MG tablet    INDERAL     TAKE 1 TABLET BY MOUTH TWICE A DAY FOR HAND TREMOR        PROTONIX 40 MG EC tablet   Generic drug:  pantoprazole     30    1 TABLET DAILY    Esophageal reflux       PROZAC PO      Take 40 mg by mouth        SIMVASTATIN PO           sirolimus Tabs tablet     30 tablet    Take 1 tablet by mouth daily.    Kidney replaced by transplant       TEMAZEPAM PO      15 mg.        traZODone 50 MG tablet    DESYREL    60    1-2 pills at night as needed    Insomnia, unspecified       VISTARIL PO           VITAMIN D PO           * Notice:  This list has 2 medication(s) that are the same as other medications prescribed for you. Read the directions carefully, and ask your doctor or other care provider to review them with you.

## 2017-07-24 NOTE — PROGRESS NOTES
[facility] Mohs Micrographic Surgery Note:  Jul 24, 2017  Nanci Van is a 57 year old female who was referred by Dr. 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 The lesion was excised using Mohs micrographic surgery in 2 stage(s), and the defect repaired by linear closure. Please refer to the operative report(s).     Given Norco 5/325 mg, 1 tab PO Q4 hours PRN pain, #5.     The patient was asked to return in 1 week(s) for bandage change/wound check.     Follow up with referring physician/provider for history of skin cancer.    Thank you for the opportunity to see and treat your patient. Please do not hesitate to contact me with any questions or concerns regarding the findings and/or operation.    I, Fredrick Joya, am serving as a scribe to document services personally performed by Dr. Tiana Burrell M.D., based on data collection and the provider's statements to me.     Provider Disclosure:   I have reviewed the content of the documentation above and have edited it as needed. I have personally performed the services documented herein and the documentation accurately represents those services provided and the decisions that I have made.       Tiana Burrell MD  , Department of Dermatology  Director, Dermatologic Surgery & Laser Center  Phone: [phone]; Fax: [phone]  [email or web address] Dermatologic Surgery & Laser Center   [address] [place] [address]   Mail Code [address], [place] [address]

## 2017-07-24 NOTE — PROGRESS NOTES
MOHS MICROGRAPHIC SURGERY REPORT   Jul 24, 2017    Surgeon: Tiana Burrell MD  Fellow:    Resident:     INDICATION:    Preoperative Diagnosis: primary squamous cell carcinoma  Location: left lower cheek  Postoperative Diagnosis: Same  Preoperative Lesion size: 0.8 x 1.1 cm    After appropriate discussion and informed consent for Mohs surgery and possible repair of the Mohs surgery defect, the patient underwent Mohs surgery as follows:      STAGE I:  The patient was placed on the operating room table.  The area was cleansed with chlorhexidine and infiltrated with 1% Lidocaine and epinephrine. Tumor was debulked. Using a #15-blade, complete excision was made around the tumor in 1 section.  Hemostasis was obtained by electrodesiccation.  A dressing was placed.  Tissue was divided into 1 tissue block(s) that were subsequently mapped, color coded and processed in the Mohs Laboratory.  Microscopic tumor (SCCIS) was  found in 1 of the tissue blocks.    STAGE II: The patient returned to the operating room.  By reference to the Mohs map, the area of positivity was delineated, infiltrated with the anesthetic mixture described above and excised in 1 section. Tissue was divided into 1 tissue block(s) that were again mapped, color coded and processed in the Mohs Laboratory.  Hemostasis was obtained in the usual manner and a dressing placed.  Microscopic tumor was not found in the tissue block.      With the lesion clear of micrographic tumor, surgery was considered complete.  The defect extended to the subcutis and measured 1.4 x 1.7 cm.    RECONSTRUCTIVE DERMATOLOGIC SURGERY REPORT  We discussed the options for wound management in full with the patient including risks/benefits/possible outcomes.     Complex Layered Linear Closure:  Because of the size and full thickness nature of the defect and tightness of the surrounding skin, a complex closure was planned.  Patient was prepped with Betasept,  local anesthesia  administered, and draped in a sterile fashion. The Mohs defect/wound was circumferentially debeveled. Burow s triangles were excised in the relaxed skin tension lines from opposite ends of the defect, oriented tangentially and the wound edges were widely undermined by dissection in the subcutaneous plane until adequate tissue mobility had been obtained and tissue could be cosmetically re-draped.  Hemostasis was obtained.  The wound edges were then advanced and closed in a layered fashion.  Postoperative length was 4.8 cm.      Estimated blood loss, minimal; complications, none; bandaging and wound care, routine. Patient was discharged in good condition and will return for bandage change in 1 week.      IFredrick, am serving as a scribe to document services personally performed by Dr. Tiana Burrell M.D., based on data collection and the provider's statements to me.     Provider Disclosure:   I have reviewed the content of the documentation above and have edited it as needed. I have personally performed the services documented herein and the documentation accurately represents those services provided and the decisions that I have made.       Tiana Burrell MD  , Department of Dermatology  Director, Dermatologic Surgery & Laser Center  Phone: [phone]; Fax: [phone]  [email or web address] Dermatologic Surgery & Laser Center   [address] [place] [address]   Mail [id number]   [place] [address]

## 2017-07-31 ENCOUNTER — OFFICE VISIT (OUTPATIENT)
Dept: DERMATOLOGY | Facility: CLINIC | Age: 57
End: 2017-07-31

## 2017-07-31 DIAGNOSIS — Z98.890 STATUS POST MOHS SURGERY: Primary | ICD-10-CM

## 2017-07-31 ASSESSMENT — PAIN SCALES - GENERAL: PAINLEVEL: MILD PAIN (3)

## 2017-07-31 NOTE — MR AVS SNAPSHOT
After Visit Summary   7/31/2017    Nanci Van    MRN: [id number]           Patient Information     Date Of Birth          1960        Visit Information        Provider Department      7/31/2017 3:00 PM Tiana Burrell MD [facility] [place] Dermatologic Surgery        Care Instructions    Starting in 1 month may use gentle massage on the area 10X/day for 30 seconds.         Wound care instructions for  ONE WEEK AFTER SURGERY    1. Leave the bandage on for 1 week after today's bandage change.  2. In 1 week you may resume your regular skin care when you remove the dressing. This includes washing with mild soap and water, applying moisturizer, make-up and sunscreen.  3. If the wound is open or bleeding in any part of the incision/graft site, you should cover the area with a bandage daily as directed below:    1. Clean and dry area with plain water using a Q-tip or sterile gauze pad.  2. Apply vaseline, aquaphor, polysporin, or bacitracin ointment to the wound  3. Cover the wound with a band-aid or a sterile non-stick gauze pad and micropore paper tape.      Repeat the instructions above until wound is completely healed    If you notice that the scar is red and firm (after you remove the dressing) this is normal and will fade over time. It may take up to 6-12 months for this to occur.    Massaging the area helps the scar fade and soften quicker. Begin to massage the area one month after the dressings have been removed. Apply pressure directly and firmly over the scar with the fingertips and move in circular motions. You may massage up to 10 times daily, each time for 30 seconds.    It is normal for there to be a tender, pimple-like bump along the scar about 6-8 weeks after surgery. This sometimes happens as the scar matures and the stitches beneath begin to dissolve. Do not pick or squeeze. It will resolve in time. If an area of the wound does open and there appears to be drainage, you may apply  one of the ointments listed in the above directions a few times every day until the wound is completely healed.    Numbness around the surgical site is normal. It can take up to 12-18 months for the feeling to return to normal. Itchiness, tingling, and occasional sharp pains might be present during this portion of the healing. All of these feelings will subside once the nerves have completely healed.      Phone numbers:  During business hours (M-F 8:00-4:30 p.m.)  [facility]-  [phone] Option 1 appt. desk  [phone]  Option 3 nurse triage line  ---------------------------------------------------------  Evenings/Weekends/Holidays  Hospital - [phone]   TTY for hearing osafzydj-[phone]  *Ask  to page dermatologist on-call  Emergency Nxnw-[phone]  TTY for hearing impaired- [phone]            Follow-ups after your visit        Your next 10 appointments already scheduled     Jul 31, 2017  3:00 PM CDT   (Arrive by 2:45 PM)   Post-Op with Tiana Burrell MD   [facility] Dermatologic Surgery ([facility])    [address] [place] [id number] [phone]              Who to contact     Please call your clinic at [phone] to:    Ask questions about your health    Make or cancel appointments    Discuss your medicines    Learn about your test results    Speak to your doctor   If you have compliments or concerns about an experience at your clinic, or if you wish to file a complaint, please contact [facility] Patient Relations at [phone] or email us at [email or web address]         Additional Information About Your Visit        MyChart Information     [facility] [employer] gives you secure access to your electronic health record. If you see a primary care provider, you can also send messages to your care team and make appointments. If you have questions, please call your  primary care clinic.  If you do not have a primary care provider, please call [phone] and they will assist you.      [employer] is an electronic gateway that provides easy, online access to your medical records. With [employer], you can request a clinic appointment, read your test results, renew a prescription or communicate with your care team.     To access your existing account, please contact your [facility] or call [phone] for assistance.        Care EveryWhere ID     This is your Care EveryWhere ID. This could be used by other organizations to access your [facility] medical records  [id number]         Blood Pressure from Last 3 Encounters:   07/24/17 116/60   05/23/17 147/85   06/15/16 170/81    Weight from Last 3 Encounters:   11/29/12 77.6 kg (171 lb)   01/12/10 76.9 kg (169 lb 8 oz)   10/20/08 78.9 kg (174 lb)              Today, you had the following     No orders found for display       Primary Care Provider Office Phone # Fax #    Keturah Mylashayla [phone]       [facility] [address]  [facility] [place] [address]        Equal Access to Services     BELINDA BLAIR : Hadii aad ku hadasho Soomaali, waaxda luqadaha, qaybta kaalmada adeegyada, jose rafael piercein kelin ruth gamino. So [facility] [phone].    ATENCIÓN: Si habla español, tiene a cabral disposición servicios gratuitos de asistencia lingüística. Llame al [phone].    We comply with applicable federal civil rights laws and [place] laws. We do not discriminate on the basis of race, color, national origin, age, disability sex, sexual orientation or gender identity.            Thank you!     Thank you for choosing [facility] DERMATOLOGIC SURGERY  for your care. Our goal is always to provide you with excellent care. Hearing back from our patients is one way we can continue to improve our services. Please take a few minutes to complete the written survey that you may receive in  the mail after your visit with us. Thank you!             Your Updated Medication List - Protect others around you: Learn how to safely use, store and throw away your medicines at www.disposemymeds.org.          This list is accurate as of: 7/31/17  2:56 PM.  Always use your most recent med list.                   Brand Name Dispense Instructions for use Diagnosis    ADDERALL PO      Take 40 mg by mouth daily        ammonium lactate 12 % lotion    LAC-HYDRIN    500 g    Apply topically 2 times daily    Xerosis cutis       ARIPiprazole 5 MG tablet    ABILIFY     Take 5 mg by mouth        B COMPLEX-C-FOLIC ACID PO           BREO ELLIPTA 200-25 MCG/INH oral inhaler   Generic drug:  fluticasone-vilanterol      Inhale 1 puff into the lungs daily        calcipotriene 0.005 % Oint     60 g    Apply with equal parts 5-fluorouracil cream twice daily to the arms and hands until a reaction develops (minimum of four days).    AK (actinic keratosis), Sun-damaged skin       calcitRIOL 0.25 MCG capsule    ROCALTROL     Take 0.25 mcg by mouth daily        COMBIVENT RESPIMAT  MCG/ACT inhaler   Generic drug:  Ipratropium-Albuterol      Inhale 1 puff into the lungs 4 times daily        EFFEXOR XR PO      Take  by mouth. 300 mg        fluorouracil 5 % cream    EFUDEX    40 g    Apply with equal parts calcipotriene ointment twice daily to the arms and hands until a reaction develops (minimum of four days).    AK (actinic keratosis), Sun-damaged skin       furosemide 20 MG tablet    LASIX     Take 20 mg by mouth daily Take 20 mg twice daily        * HYDROcodone-acetaminophen 5-325 MG per tablet    NORCO    5 tablet    Take 1 tablet by mouth every 6 hours as needed for pain    Squamous cell carcinoma of skin of face       * HYDROcodone-acetaminophen 5-325 MG per tablet    NORCO    5 tablet    Take 1 tablet by mouth every 4 hours as needed for moderate to severe pain    Pain       insulin detemir 100 UNIT/ML injection    LEVEMIR      Inject Subcutaneous At Bedtime        mupirocin 2 % ointment    BACTROBAN    22 g    Use 1 to 2 times a day to affected area.    Squamous cell carcinoma of left lower leg       niacinamide 500 MG tablet     60 tablet    Take 1 tablet (500 mg) by mouth 2 times daily (with meals)    History of nonmelanoma skin cancer       potassium 75 MG Tabs           predniSONE 5 MG tablet    DELTASONE    0    1 TABLET DAILY        propranolol 10 MG tablet    INDERAL     TAKE 1 TABLET BY MOUTH TWICE A DAY FOR HAND TREMOR        PROTONIX 40 MG EC tablet   Generic drug:  pantoprazole     30    1 TABLET DAILY    Esophageal reflux       PROZAC PO      Take 40 mg by mouth        SIMVASTATIN PO           sirolimus Tabs tablet     30 tablet    Take 1 tablet by mouth daily.    Kidney replaced by transplant       TEMAZEPAM PO      15 mg.        traZODone 50 MG tablet    DESYREL    60    1-2 pills at night as needed    Insomnia, unspecified       VISTARIL PO           VITAMIN D PO           * Notice:  This list has 2 medication(s) that are the same as other medications prescribed for you. Read the directions carefully, and ask your doctor or other care provider to review them with you.

## 2017-07-31 NOTE — NURSING NOTE
Chief Complaint   Patient presents with     Derm Problem     1 week mohs post op on the left cheek. States there was pain but denies fevers or chills.     Socorro Paniagua CMA

## 2017-07-31 NOTE — PATIENT INSTRUCTIONS
Starting in 1 month may use gentle massage on the area 10X/day for 30 seconds.         Wound care instructions for  ONE WEEK AFTER SURGERY    1. Leave the bandage on for 1 week after today's bandage change.  2. In 1 week you may resume your regular skin care when you remove the dressing. This includes washing with mild soap and water, applying moisturizer, make-up and sunscreen.  3. If the wound is open or bleeding in any part of the incision/graft site, you should cover the area with a bandage daily as directed below:    1. Clean and dry area with plain water using a Q-tip or sterile gauze pad.  2. Apply vaseline, aquaphor, polysporin, or bacitracin ointment to the wound  3. Cover the wound with a band-aid or a sterile non-stick gauze pad and micropore paper tape.      Repeat the instructions above until wound is completely healed    If you notice that the scar is red and firm (after you remove the dressing) this is normal and will fade over time. It may take up to 6-12 months for this to occur.    Massaging the area helps the scar fade and soften quicker. Begin to massage the area one month after the dressings have been removed. Apply pressure directly and firmly over the scar with the fingertips and move in circular motions. You may massage up to 10 times daily, each time for 30 seconds.    It is normal for there to be a tender, pimple-like bump along the scar about 6-8 weeks after surgery. This sometimes happens as the scar matures and the stitches beneath begin to dissolve. Do not pick or squeeze. It will resolve in time. If an area of the wound does open and there appears to be drainage, you may apply one of the ointments listed in the above directions a few times every day until the wound is completely healed.    Numbness around the surgical site is normal. It can take up to 12-18 months for the feeling to return to normal. Itchiness, tingling, and occasional sharp pains might be present during this portion  of the healing. All of these feelings will subside once the nerves have completely healed.      Phone numbers:  During business hours (M-F 8:00-4:30 p.m.)  [facility]-  [phone] Option 1 appt. desk  [phone]  Option 3 nurse triage line  ---------------------------------------------------------  Evenings/Weekends/Holidays  Hospital - [phone]   TTY for hearing jmjqpshq-[phone]  *Ask  to page dermatologist on-call  Emergency Rfzh-[phone]  TTY for hearing impaired- [phone]

## 2017-07-31 NOTE — PROGRESS NOTES
[facility] Dermatology Post-operative Visit Note:    Jul 31, 2017     Subjective: The patient follows up for a wound check. The patient reports doing well without any bleeding, purulence, or excess pain/tenderness at the site. She feels that the tenderness of the area comes and goes, the pain/tenderness feels like a large bruise. The sensation will resolve as long as she doesn't touch the area.     ROS: No fevers. Is feeling well.     Objective:  Appropriately healing surgical site on the left lower cheek without purulence or tenderness and an appropriate amount of expected surrounding erythema with completely opposed epidermal edges.    Assessment and Plan:   1. Appropriately healing surgical site without any signs of infection status post Mohs 7/24/2017, left lower cheek  --Standard bandaging replaced; full instructions as per AVS.  --Begin massaging in 1 month.  --Recommend sunscreens SPF #50 or greater and protective clothing. Risk of scar dyspigmentation discussed.   --Continue Nicotinamide twice per day.   --Discussed that the pain of the site should improve in intensity and frequency on a nearly daily basis; if the pain worsens should contact clinic.     Return in the next 3-6 months for total body skin exam; sooner with any new, changing or rapidly growing lesions.     Staff Involved:   Staff/Scribe    Scribe Disclosure:   I, Zeina Chamberlain, am serving as a scribe to document services personally performed by Dr. Tiana Burrell, based on data collection and the provider's statements to me.     Provider Disclosure:   I have reviewed the content of the documentation above and have edited it as needed. I have personally performed the services documented herein and the documentation accurately represents those services provided and the decisions that I have made.       Tiana Burrell MD  , Department of Dermatology  Director, [facility]  Phone:  [phone]; Fax: [phone]  [email or web address] - Dermatologic Surgery & Laser Center   [address] [place] [address]   Mail Code [place] [address]

## 2017-07-31 NOTE — NURSING NOTE
Soaked off bandage, cleansed site with hibaclens and replaced bandage. Mastisol, steri strips and paper tape were applied. Instructions for wound care were gone over with patient and all questions were answered.     Socorro Paniagua CMA

## 2017-07-31 NOTE — LETTER
7/31/2017       RE: Nanci Van  [address]   [facility] [address]     Dear Colleague,    Thank you for referring your patient, Nanci Van, to the [facility] DERMATOLOGIC SURGERY at [facility]. Please see a copy of my visit note below.    [facility] Dermatology Post-operative Visit Note:    Jul 31, 2017     Subjective: The patient follows up for a wound check. The patient reports doing well without any bleeding, purulence, or excess pain/tenderness at the site. She feels that the tenderness of the area comes and goes, the pain/tenderness feels like a large bruise. The sensation will resolve as long as she doesn't touch the area.     ROS: No fevers. Is feeling well.     Objective:  Appropriately healing surgical site on the left lower cheek without purulence or tenderness and an appropriate amount of expected surrounding erythema with completely opposed epidermal edges.    Assessment and Plan:   1. Appropriately healing surgical site without any signs of infection status post Mohs 7/24/2017, left lower cheek  --Standard bandaging replaced; full instructions as per AVS.  --Begin massaging in 1 month.  --Recommend sunscreens SPF #50 or greater and protective clothing. Risk of scar dyspigmentation discussed.   --Continue Nicotinamide twice per day.   --Discussed that the pain of the site should improve in intensity and frequency on a nearly daily basis; if the pain worsens should contact clinic.     Return in the next 3-6 months for total body skin exam; sooner with any new, changing or rapidly growing lesions.     Staff Involved:   Staff/Scribe    Scribe Disclosure:   I, Zeina Chamberlain, am serving as a scribe to document services personally performed by Dr. Tiana Burrell, based on data collection and the provider's statements to me.     Provider Disclosure:   I have reviewed the content of the documentation above and have edited it as  needed. I have personally performed the services documented herein and the documentation accurately represents those services provided and the decisions that I have made.       Tiana Burrell MD  , Department of Dermatology  Director, [facility]  Phone: [phone]; Fax: [phone]  [email or web address] - Dermatologic Surgery & Laser Center   [address] [place] [address]   Mail Code [place] [address]

## 2018-01-11 DIAGNOSIS — Z48.298 AFTERCARE FOLLOWING ORGAN TRANSPLANT: ICD-10-CM

## 2018-01-11 DIAGNOSIS — Z79.899 IMMUNOSUPPRESSIVE MANAGEMENT ENCOUNTER FOLLOWING KIDNEY TRANSPLANT: ICD-10-CM

## 2018-01-11 DIAGNOSIS — Z94.0 IMMUNOSUPPRESSIVE MANAGEMENT ENCOUNTER FOLLOWING KIDNEY TRANSPLANT: ICD-10-CM

## 2018-01-11 DIAGNOSIS — Z94.0 KIDNEY REPLACED BY TRANSPLANT: ICD-10-CM

## 2018-01-11 LAB
SIROLIMUS BLD-MCNC: 4.9 UG/L (ref 5–15)
TME LAST DOSE: ABNORMAL H

## 2018-01-11 PROCEDURE — 80195 ASSAY OF SIROLIMUS: CPT | Performed by: TRANSPLANT SURGERY

## 2018-05-29 ENCOUNTER — TELEPHONE (OUTPATIENT)
Dept: DERMATOLOGY | Facility: CLINIC | Age: 58
End: 2018-05-29

## 2018-05-29 NOTE — TELEPHONE ENCOUNTER
[facility] Call Center    Phone Message    May a detailed message be left on voicemail: yes    Reason for Call: Other: Pt is requesting skin check in derm surg clinic.      Action Taken: Message routed to:  [facility] ([facility]): Dermatology Surgery

## 2018-05-29 NOTE — TELEPHONE ENCOUNTER
Spoke with pt and she informed me that she made an appointment with dermatology specialist already.     Timo Juarez

## 2018-08-10 ENCOUNTER — TRANSFERRED RECORDS (OUTPATIENT)
Dept: HEALTH INFORMATION MANAGEMENT | Facility: CLINIC | Age: 58
End: 2018-08-10

## 2018-08-10 DIAGNOSIS — Z85.828 HISTORY OF NONMELANOMA SKIN CANCER: ICD-10-CM

## 2018-08-13 RX ORDER — NIACINAMIDE 500 MG
TABLET ORAL
Qty: 100 TABLET | OUTPATIENT
Start: 2018-08-13

## 2019-01-01 ENCOUNTER — OFFICE VISIT (OUTPATIENT)
Dept: NEPHROLOGY | Facility: CLINIC | Age: 59
End: 2019-01-01
Attending: INTERNAL MEDICINE
Payer: MEDICARE

## 2019-01-01 ENCOUNTER — HEALTH MAINTENANCE LETTER (OUTPATIENT)
Age: 59
End: 2019-01-01

## 2019-01-01 ENCOUNTER — RECORDS - HEALTHEAST (OUTPATIENT)
Dept: LAB | Facility: CLINIC | Age: 59
End: 2019-01-01

## 2019-01-01 ENCOUNTER — TELEPHONE (OUTPATIENT)
Dept: TRANSPLANT | Facility: CLINIC | Age: 59
End: 2019-01-01

## 2019-01-01 ENCOUNTER — TRANSFERRED RECORDS (OUTPATIENT)
Dept: HEALTH INFORMATION MANAGEMENT | Facility: CLINIC | Age: 59
End: 2019-01-01

## 2019-01-01 VITALS
HEART RATE: 68 BPM | WEIGHT: 158.8 LBS | TEMPERATURE: 98.8 F | SYSTOLIC BLOOD PRESSURE: 171 MMHG | OXYGEN SATURATION: 97 % | DIASTOLIC BLOOD PRESSURE: 76 MMHG

## 2019-01-01 DIAGNOSIS — Z94.0 HTN, KIDNEY TRANSPLANT RELATED: ICD-10-CM

## 2019-01-01 DIAGNOSIS — E87.6 HYPOKALEMIA: ICD-10-CM

## 2019-01-01 DIAGNOSIS — N25.81 SECONDARY RENAL HYPERPARATHYROIDISM (H): ICD-10-CM

## 2019-01-01 DIAGNOSIS — Z94.0 KIDNEY REPLACED BY TRANSPLANT: Primary | ICD-10-CM

## 2019-01-01 DIAGNOSIS — Z48.298 AFTERCARE FOLLOWING ORGAN TRANSPLANT: ICD-10-CM

## 2019-01-01 DIAGNOSIS — E55.9 VITAMIN D DEFICIENCY: ICD-10-CM

## 2019-01-01 DIAGNOSIS — D84.9 IMMUNOSUPPRESSION (H): ICD-10-CM

## 2019-01-01 DIAGNOSIS — J84.116 CRYPTOGENIC ORGANIZING PNEUMONIA (H): ICD-10-CM

## 2019-01-01 DIAGNOSIS — I15.1 HTN, KIDNEY TRANSPLANT RELATED: ICD-10-CM

## 2019-01-01 LAB
ANION GAP SERPL CALCULATED.3IONS-SCNC: 10 MMOL/L (ref 5–18)
ANION GAP SERPL CALCULATED.3IONS-SCNC: 11 MMOL/L (ref 5–18)
ANION GAP SERPL CALCULATED.3IONS-SCNC: 14 MMOL/L (ref 5–18)
BASOPHILS # BLD AUTO: 0 THOU/UL (ref 0–0.2)
BASOPHILS # BLD AUTO: 0.1 THOU/UL (ref 0–0.2)
BASOPHILS NFR BLD AUTO: 0 % (ref 0–2)
BASOPHILS NFR BLD AUTO: 1 % (ref 0–2)
BUN SERPL-MCNC: 18 MG/DL (ref 8–22)
BUN SERPL-MCNC: 18 MG/DL (ref 8–22)
BUN SERPL-MCNC: 25 MG/DL (ref 8–22)
CALCIUM SERPL-MCNC: 10.1 MG/DL (ref 8.5–10.5)
CALCIUM SERPL-MCNC: 10.5 MG/DL (ref 8.5–10.5)
CALCIUM SERPL-MCNC: 9.8 MG/DL (ref 8.5–10.5)
CHLORIDE BLD-SCNC: 100 MMOL/L (ref 98–107)
CHLORIDE BLD-SCNC: 96 MMOL/L (ref 98–107)
CHLORIDE BLD-SCNC: 99 MMOL/L (ref 98–107)
CHOLEST SERPL-MCNC: 211 MG/DL (ref 0–199)
CO2 SERPL-SCNC: 31 MMOL/L (ref 22–31)
CO2 SERPL-SCNC: 32 MMOL/L (ref 22–31)
CO2 SERPL-SCNC: 34 MMOL/L (ref 22–31)
CREAT SERPL-MCNC: 1.63 MG/DL (ref 0.6–1.1)
CREAT SERPL-MCNC: 1.64 MG/DL (ref 0.6–1.1)
CREAT SERPL-MCNC: 1.78 MG/DL (ref 0.55–1.02)
CREAT SERPL-MCNC: 1.86 MG/DL (ref 0.6–1.1)
EOSINOPHIL # BLD AUTO: 0.1 THOU/UL (ref 0–0.4)
EOSINOPHIL # BLD AUTO: 0.2 THOU/UL (ref 0–0.4)
EOSINOPHIL NFR BLD AUTO: 1 % (ref 0–6)
EOSINOPHIL NFR BLD AUTO: 2 % (ref 0–6)
ERYTHROCYTE [DISTWIDTH] IN BLOOD BY AUTOMATED COUNT: 13.2 % (ref 11–14.5)
ERYTHROCYTE [DISTWIDTH] IN BLOOD BY AUTOMATED COUNT: 14.1 % (ref 11–14.5)
GAMMA INTERFERON BACKGROUND BLD IA-ACNC: 0.11 IU/ML
GFR SERPL CREATININE-BSD FRML MDRD: 28 ML/MIN/1.73M2
GFR SERPL CREATININE-BSD FRML MDRD: 30 ML/MIN/1.73M2
GFR SERPL CREATININE-BSD FRML MDRD: 32 ML/MIN/1.73M2
GFR SERPL CREATININE-BSD FRML MDRD: 32 ML/MIN/1.73M2
GLUCOSE BLD-MCNC: 108 MG/DL (ref 70–125)
GLUCOSE BLD-MCNC: 122 MG/DL (ref 70–125)
GLUCOSE BLD-MCNC: 175 MG/DL (ref 70–125)
GLUCOSE SERPL-MCNC: 204 MG/DL (ref 70–100)
HBA1C MFR BLD: 7.7 % (ref 0–?)
HCT VFR BLD AUTO: 37.4 % (ref 35–47)
HCT VFR BLD AUTO: 37.8 % (ref 35–47)
HDLC SERPL-MCNC: 58 MG/DL
HGB BLD-MCNC: 11.4 G/DL (ref 12–16)
HGB BLD-MCNC: 11.8 G/DL (ref 12–16)
LDLC SERPL CALC-MCNC: 103 MG/DL
LYMPHOCYTES # BLD AUTO: 1.4 THOU/UL (ref 0.8–4.4)
LYMPHOCYTES # BLD AUTO: 1.7 THOU/UL (ref 0.8–4.4)
LYMPHOCYTES NFR BLD AUTO: 10 % (ref 20–40)
LYMPHOCYTES NFR BLD AUTO: 20 % (ref 20–40)
M TB IFN-G BLD-IMP: NEGATIVE
MAGNESIUM SERPL-MCNC: 1.8 MG/DL (ref 1.8–2.6)
MCH RBC QN AUTO: 29 PG (ref 27–34)
MCH RBC QN AUTO: 29.9 PG (ref 27–34)
MCHC RBC AUTO-ENTMCNC: 30.5 G/DL (ref 32–36)
MCHC RBC AUTO-ENTMCNC: 31.2 G/DL (ref 32–36)
MCV RBC AUTO: 95 FL (ref 80–100)
MCV RBC AUTO: 96 FL (ref 80–100)
MITOGEN IGNF BCKGRD COR BLD-ACNC: 0 IU/ML
MITOGEN IGNF BCKGRD COR BLD-ACNC: 0.01 IU/ML
MONOCYTES # BLD AUTO: 1.4 THOU/UL (ref 0–0.9)
MONOCYTES # BLD AUTO: 1.6 THOU/UL (ref 0–0.9)
MONOCYTES NFR BLD AUTO: 12 % (ref 2–10)
MONOCYTES NFR BLD AUTO: 16 % (ref 2–10)
NEUTROPHILS # BLD AUTO: 10.3 THOU/UL (ref 2–7.7)
NEUTROPHILS # BLD AUTO: 5.2 THOU/UL (ref 2–7.7)
NEUTROPHILS NFR BLD AUTO: 62 % (ref 50–70)
NEUTROPHILS NFR BLD AUTO: 77 % (ref 50–70)
NONHDLC SERPL-MCNC: 153 MG/DL
PLATELET # BLD AUTO: 158 THOU/UL (ref 140–440)
PLATELET # BLD AUTO: 208 THOU/UL (ref 140–440)
PMV BLD AUTO: 11 FL (ref 8.5–12.5)
PMV BLD AUTO: 12.2 FL (ref 8.5–12.5)
POTASSIUM BLD-SCNC: 3.1 MMOL/L (ref 3.5–5)
POTASSIUM BLD-SCNC: 3.1 MMOL/L (ref 3.5–5)
POTASSIUM BLD-SCNC: 3.2 MMOL/L (ref 3.5–5)
POTASSIUM BLD-SCNC: 3.9 MMOL/L (ref 3.5–5)
POTASSIUM BLD-SCNC: 4.2 MMOL/L (ref 3.5–5)
POTASSIUM SERPL-SCNC: 3.7 MMOL/L (ref 3.5–5.1)
POTASSIUM SERPL-SCNC: 3.7 MMOL/L (ref 3.5–5.1)
QTF INTERPRETATION: NORMAL
QTF MITOGEN - NIL: 2.48 IU/ML
RBC # BLD AUTO: 3.93 MILL/UL (ref 3.8–5.4)
RBC # BLD AUTO: 3.95 MILL/UL (ref 3.8–5.4)
SODIUM SERPL-SCNC: 141 MMOL/L (ref 136–145)
SODIUM SERPL-SCNC: 142 MMOL/L (ref 136–145)
SODIUM SERPL-SCNC: 144 MMOL/L (ref 136–145)
TRIGL SERPL-MCNC: 250 MG/DL
WBC: 13.7 THOU/UL (ref 4–11)
WBC: 14.1 THOU/UL (ref 4–11)
WBC: 8.5 THOU/UL (ref 4–11)

## 2019-01-01 PROCEDURE — G0463 HOSPITAL OUTPT CLINIC VISIT: HCPCS | Mod: ZF

## 2019-01-01 RX ORDER — MYCOPHENOLATE MOFETIL 250 MG/1
500 CAPSULE ORAL 2 TIMES DAILY
Qty: 120 CAPSULE | Refills: 11 | Status: SHIPPED | OUTPATIENT
Start: 2019-01-01

## 2019-01-01 ASSESSMENT — PAIN SCALES - GENERAL: PAINLEVEL: NO PAIN (0)

## 2019-08-22 NOTE — TELEPHONE ENCOUNTER
Provider Call: General  Route to LPN    Reason for call: Dr Reyes would like a call back  Pt having some issues and he thinks she should be seen here again     Call back needed? Yes pager  [phone]    Return Call Needed  Same as documented in contacts section  When to return call?: Greater than one day: Route standard priority

## 2019-08-22 NOTE — TELEPHONE ENCOUNTER
Cr elevated 1.8-1.9, sepsis earlier this year  Urine protein 1 gram  Lung infiltrates, bronch done yesterday    Dr. Reyes (pager is [phone]) is interested in changing her off rapa, is concerned that rapa is affecting lung disease. She is still having issues with skin CA while on rapa.     Dr. Reyes would like her to be seen in one month. Ok to overbook per [facility] if needed.

## 2019-08-26 NOTE — TELEPHONE ENCOUNTER
Spoke with the patient and confirmed renal  appointments on 8/29.  Informed patient an itinerary can be accessed on [employer],

## 2019-08-29 NOTE — PATIENT INSTRUCTIONS
Start mycophenolate mofetil 500 mg twice daily.    After starting mycophenolate mofetil x two days, then stop sirolimus (Rapamune).

## 2019-08-29 NOTE — TELEPHONE ENCOUNTER
Call placed to patient to clarify plan for changing IS. Pt thought she was supposed to get labs drawn 2 days after starting mycophenolate.     Explained to patient she should start mycophenolate 500 mg BID tomorrow, after she has been on it 2 days she can stop sirolimus.     She needs labs with MPA level in 1 week, then every other week labs for a few draws. MPA level to be reviewed with Dr Robertson.     Pt v/u of above plan. Nervous about making changes to her IS, would like coordinator to call and review labs with her after resulted next week.     LPN TASK:   Enter orders in Epic for BMP, CBC and MPA level to be done next week.   Standing orders for BMP and CBC every other week

## 2019-08-29 NOTE — NURSING NOTE
Nanci Van was seen today in clinic by this writer. Transplant coordinators introduced, contact information given. Medications, lab orders, lab frequency, and necessary follow up discussed with patient. Patient voiced understanding and agreement of education and plan.     Natty Plata RN

## 2019-08-29 NOTE — PROGRESS NOTES
CHRONIC TRANSPLANT NEPHROLOGY VISIT    Assessment & Plan   # DDKT: {trend:828232931}   - Baseline Cr ~ ***   - Proteinuria: { :357347703}   - Date DSA Last Checked: {FV RENAL TX DSA DATE:1497}       Latest DSA: { :729254078}   - BK Viremia: { :1494}   - Kidney Tx Biopsy: { :742409}    # Immunosuppression: Sirolimus (goal ***) and Prednisone (5mg daily)   - Changes: { :440931}    # Prophylaxis:   - PJP: { :631591216}    {# Blood Pressure    :044527}    {# Diabetes/Elevated BG (Optional)    :130227520}    {# Anemia/Erythrocytosis (Optional)    :759826157}    {# Mineral Bone Disorder (Optional)    :399812976}     {# Electrolytes (Optional)    :883557934}    {# Optional Problem    :371882}    {# Skin cancer (risk)   :278229356}    # Medical Compliance: { :261928906}    # Transplant History:  Etiology of kidney failure: Reflux nephropathy  Tx: DDKT  Transplant: 1987 (Kidney), 1984 (Kidney)  Donor Type:  - Brain Death Donor Class: Standard Criteria Donor  Significant changes in immunosuppression: None  Significant transplant-related complications: None    Transplant Office Phone Number: [phone]    Assessment and plan was discussed with the patient and she voiced her understanding and agreement.    Return visit: No follow-ups on file.    Chief Complaint   Ms. Van is a 59 year old here for routine follow up.    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 She was last seen in clinic at [facility] by Dr. Reyes on 18; please see that note for further details.     Recent Hospitalizations:  [] No [] Yes    New Medical Issues: [] No [] Yes    Decreased energy: [] No [] Yes    Chest pain or SOB with exertion:  [] No [] Yes    Appetite change or weight change: [] No [] Yes    Nausea, vomiting or diarrhea:  [] No [] Yes    Fever,  "sweats or chills: [] No [] Yes    Leg swelling: [] No [] Yes      Home BP: { :22583764}    Review of Systems   A comprehensive review of systems was obtained and negative, except as noted in the HPI or PMH.    Problem List   Patient Active Problem List   Diagnosis     Mixed hyperlipidemia     Kidney replaced by transplant     Essential hypertension     Other osteoporosis     Disorder of kidney and ureter     Esophageal reflux     Attention deficit disorder     Squamous cell carcinoma       Social History   Social History     Tobacco Use     Smoking status: Never Smoker   Substance Use Topics     Alcohol use: Yes     Comment: socially     Drug use: No       Allergies   Allergies   Allergen Reactions     Codeine      Erythromycin      \"violently ill\"     Esters      tigan     Fosamax      GI     Levaquin [Levofloxacin]        Medications   Current Outpatient Medications   Medication Sig     ammonium lactate (LAC-HYDRIN) 12 % lotion Apply topically 2 times daily     Amphetamine-Dextroamphetamine (ADDERALL PO) Take 40 mg by mouth daily     ARIPiprazole (ABILIFY) 5 MG tablet Take 5 mg by mouth     B COMPLEX-C-FOLIC ACID PO      calcipotriene 0.005 % OINT Apply with equal parts 5-fluorouracil cream twice daily to the arms and hands until a reaction develops (minimum of four days).     calcitRIOL (ROCALTROL) 0.25 MCG capsule Take 0.25 mcg by mouth daily     Cholecalciferol (VITAMIN D PO)      fluorouracil (EFUDEX) 5 % cream Apply with equal parts calcipotriene ointment twice daily to the arms and hands until a reaction develops (minimum of four days).     FLUoxetine HCl (PROZAC PO) Take 40 mg by mouth     fluticasone-vilanterol (BREO ELLIPTA) 200-25 MCG/INH oral inhaler Inhale 1 puff into the lungs daily     Furosemide (LASIX) 20 MG tablet Take 20 mg by mouth daily Take 20 mg twice daily     HYDROcodone-acetaminophen (NORCO) 5-325 MG per tablet Take 1 tablet by mouth every 4 hours as needed for moderate to severe pain     " HYDROcodone-acetaminophen (NORCO) 5-325 MG per tablet Take 1 tablet by mouth every 6 hours as needed for pain     HydrOXYzine Pamoate (VISTARIL PO)      insulin detemir (LEVEMIR) 100 UNIT/ML soln Inject Subcutaneous At Bedtime     Ipratropium-Albuterol (COMBIVENT RESPIMAT)  MCG/ACT inhaler Inhale 1 puff into the lungs 4 times daily     mupirocin (BACTROBAN) 2 % ointment Use 1 to 2 times a day to affected area.     niacinamide 500 MG tablet Take 1 tablet (500 mg) by mouth 2 times daily (with meals)     potassium 75 MG TABS      PREDNISONE 5 MG OR TABS 1 TABLET DAILY     propranolol (INDERAL) 10 MG tablet TAKE 1 TABLET BY MOUTH TWICE A DAY FOR HAND TREMOR     PROTONIX 40 MG OR TBEC 1 TABLET DAILY     RAPAMUNE 1 MG tablet Take 1 tablet by mouth daily.     SIMVASTATIN PO      TEMAZEPAM PO 15 mg.      TRAZODONE HCL 50 MG OR TABS 1-2 pills at night as needed     Venlafaxine HCl (EFFEXOR XR PO) Take  by mouth. 300 mg     No current facility-administered medications for this visit.      There are no discontinued medications.    Physical Exam   Vital Signs: There were no vitals taken for this visit.    GENERAL APPEARANCE: alert and no distress  HENT: mouth without ulcers or lesions  LYMPHATICS: no cervical or supraclavicular nodes  RESP: lungs clear to auscultation - no rales, rhonchi or wheezes  CV: regular rhythm, normal rate, no rub, no murmur  EDEMA: no LE edema bilaterally  ABDOMEN: soft, nondistended, nontender, bowel sounds normal  MS: extremities normal - no gross deformities noted, no evidence of inflammation in joints, no muscle tenderness  SKIN: no rash  TX KIDNEY: {:124099}  DIALYSIS ACCESS:  { :584840744}      Data     Renal Latest Ref Rng & Units 5/30/2019 5/30/2019 8/3/2018   Na 133 - 144 mmol/L - - -   Na (external) 136 - 145 mmol/L - - 138   K 3.5 - 5.1 mmol/L 3.7 3.7 -   K (external) 3.5 - 5.2 mmol/L - - 3.9   Cl 94 - 109 mmol/L - - -   Cl (external) 98 - 109 mmol/L - - 101   CO2 20 - 32 mmol/L - - -    CO2 (external) 22 - 31 mmol/L - - 26   BUN 7 - 30 mg/dL - - -   BUN (external) 9 - 26 mg/dL - - 18   Cr 0.55 - 1.02 mg/dL - 1.78(A) -   Cr (external) 0.55 - 1.02 mg/dL - - 1.45(H)   Glucose 70 - 100 mg/dL - 204(A) -   Glucose (external) 70 - 180 mg/dL - - 161   Ca  8.5 - 10.4 mg/dL - - -   Ca (external) 8.4 - 10.4 mg/dL - - 10.1   Mg 1.6 - 2.3 mg/dL - - -   Mg (external) 1.5 - 2.4 mg/dL - - -     Bone Health Latest Ref Rng & Units 8/3/2018 1/11/2018 5/19/2017   Phos (external) 2.3 - 4.7 mg/dL 2.8 3.3 2.9     Heme Latest Ref Rng & Units 4/18/2016 1/11/2016 7/10/2015   WBC 4.0 - 11.0 10e9/L - - -   WBC (external) 3.8 - 11.0 k/cmm 9.8 8.3 11.5(H)   Hgb 11.7 - 15.7 g/dL - - -   Hgb (external) 11.8 - 15.5 g/dL 15.0 14.7 14.7   Plt 150 - 450 10e9/L - - -   Plt (external) 140 - 450 k/cmm 234 248 256     Liver Latest Ref Rng & Units 8/3/2018 1/11/2018 5/19/2017   AP 40 - 150 U/L - - -   TBili 0.2 - 1.3 mg/dL - - -   ALT 0 - 50 U/L - - -   AST 0 - 45 U/L - - -   Tot Protein 6.8 - 8.8 g/dL - - -   Albumin 3.9 - 5.1 g/dL - - -   Albumin (external) 3.4 - 5.0 g/dL 3.5 3.4 3.5     Pancreas Latest Ref Rng & Units 5/30/2019 8/3/2018 1/11/2018   A1C 0 - <=5.6 % 7.7(A) - -   A1C (external) 4.0 - 5.6 % - 7.3(H) 6.7(H)   Lipase 20 - 250 U/L - - -     Iron studies Latest Ref Rng & Units 3/12/2009 10/17/2008 12/11/2006   Iron 35 - 180 ug/dL 118 100 21(L)   Iron sat 15 - 46 % - 41 11(L)   Ferritin 10 - 300 ng/mL 318(H) 116 167     [facility] Latest Ref Rng & Units 9/3/2008 12/8/2006 8/3/2005   CMV IgG EU/mL 118.9 114.5 -   CMV IgM <0.90 <0.90  No detectable antibody. - -   Hep B Surf NEG - - Negative     Scribe Disclosure:  I, Vashti Cornell, am serving as a scribe to document services personally performed by Martínez Robertson M.D. at this visit, based upon the provider's statements to me. All documentation has been reviewed by the aforementioned provider prior to being entered into the official medical record.

## 2019-08-29 NOTE — PROGRESS NOTES
CHRONIC TRANSPLANT NEPHROLOGY VISIT    Assessment & Plan   # {TX STATUS:262737735}: {trend:184979027}   - Baseline Cr ~ ***   - Proteinuria: { :315362352}   - Date DSA Last Checked: {FV RENAL TX DSA DATE:1497}       Latest DSA: { :129569668}   - BK Viremia: { :703693}   - Kidney Tx Biopsy: { :955947}    {# Pancreas Transplant (Optional)    :112137644}    # Immunosuppression: {medications :801472073}   - Changes: { :776978}    # Prophylaxis:   - PJP: { :485096760}    {# Blood Pressure    :283309}    {# Diabetes/Elevated BG (Optional)    :898487139}    {# Anemia/Erythrocytosis (Optional)    :815929174}    # Mineral Bone Disorder:   - Secondary renal hyperparathyroidism; PTH level: Not checked recently  - Vitamin D; level: Not checked recently  - Calcium; level: Not checked recently  - Phosphorus; level: Not checked recently       # Electrolytes:   - Potassium; level: Normal  - Magnesium; level: Not checked recently  - Bicarbonate; level: Not checked recently  - Sodium; level: { :434223945}      {# Optional Problem    :387357}    {# Skin cancer (risk)   :527145140}    # Medical Compliance: { :737512569}    # Transplant History:  Etiology of kidney failure: { :733254}  Tx: { :621042863}  Transplant: 1987 (Kidney), 1984 (Kidney)  Donor Type:  - Brain Death Donor Class: Standard Criteria Donor  Significant changes in immunosuppression: { :257510}  Significant transplant-related complications: { :488612044}    Transplant Office Phone Number: [phone]    Assessment and plan was discussed with the patient and she voiced her understanding and agreement.    Return visit: No follow-ups on file.    [facility]    Chief Complaint   Ms. Van is a 59 year old here for { :735359076}.    History of Present Illness    ***    Recent Hospitalizations:  [] No [] Yes    New Medical Issues: [] No [] Yes    Decreased energy: [] No [] Yes    Chest pain or SOB with exertion:  [] No [] Yes    Appetite change or  "weight change: [] No [] Yes    Nausea, vomiting or diarrhea:  [] No [] Yes    Fever, sweats or chills: [] No [] Yes    Leg swelling: [] No [] Yes      Home BP: { :82942673}    Review of Systems   {ROS:043149660}    Problem List   Patient Active Problem List   Diagnosis     Mixed hyperlipidemia     Kidney replaced by transplant     Essential hypertension     Other osteoporosis     Disorder of kidney and ureter     Esophageal reflux     Attention deficit disorder     Squamous cell carcinoma       Social History   Social History     Tobacco Use     Smoking status: Never Smoker   Substance Use Topics     Alcohol use: Yes     Comment: socially     Drug use: No       Allergies   Allergies   Allergen Reactions     Codeine      Erythromycin      \"violently ill\"     Esters      tigan     Fosamax      GI     Levaquin [Levofloxacin]        Medications   Current Outpatient Medications   Medication Sig     ammonium lactate (LAC-HYDRIN) 12 % lotion Apply topically 2 times daily     Amphetamine-Dextroamphetamine (ADDERALL PO) Take 40 mg by mouth daily     ARIPiprazole (ABILIFY) 5 MG tablet Take 5 mg by mouth     B COMPLEX-C-FOLIC ACID PO      calcipotriene 0.005 % OINT Apply with equal parts 5-fluorouracil cream twice daily to the arms and hands until a reaction develops (minimum of four days).     calcitRIOL (ROCALTROL) 0.25 MCG capsule Take 0.25 mcg by mouth daily     Cholecalciferol (VITAMIN D PO)      fluorouracil (EFUDEX) 5 % cream Apply with equal parts calcipotriene ointment twice daily to the arms and hands until a reaction develops (minimum of four days).     FLUoxetine HCl (PROZAC PO) Take 40 mg by mouth     fluticasone-vilanterol (BREO ELLIPTA) 200-25 MCG/INH oral inhaler Inhale 1 puff into the lungs daily     Furosemide (LASIX) 20 MG tablet Take 20 mg by mouth daily Take 20 mg twice daily     HYDROcodone-acetaminophen (NORCO) 5-325 MG per tablet Take 1 tablet by mouth every 4 hours as needed for moderate to severe pain " "    HYDROcodone-acetaminophen (NORCO) 5-325 MG per tablet Take 1 tablet by mouth every 6 hours as needed for pain     HydrOXYzine Pamoate (VISTARIL PO)      insulin detemir (LEVEMIR) 100 UNIT/ML soln Inject Subcutaneous At Bedtime     Ipratropium-Albuterol (COMBIVENT RESPIMAT)  MCG/ACT inhaler Inhale 1 puff into the lungs 4 times daily     mupirocin (BACTROBAN) 2 % ointment Use 1 to 2 times a day to affected area.     niacinamide 500 MG tablet Take 1 tablet (500 mg) by mouth 2 times daily (with meals)     potassium 75 MG TABS      PREDNISONE 5 MG OR TABS 1 TABLET DAILY     propranolol (INDERAL) 10 MG tablet TAKE 1 TABLET BY MOUTH TWICE A DAY FOR HAND TREMOR     PROTONIX 40 MG OR TBEC 1 TABLET DAILY     RAPAMUNE 1 MG tablet Take 1 tablet by mouth daily.     SIMVASTATIN PO      TEMAZEPAM PO 15 mg.      TRAZODONE HCL 50 MG OR TABS 1-2 pills at night as needed     Venlafaxine HCl (EFFEXOR XR PO) Take  by mouth. 300 mg     No current facility-administered medications for this visit.      There are no discontinued medications.    Physical Exam   Vital Signs: There were no vitals taken for this visit.    {EXAM    :243575166::\"GENERAL APPEARANCE: alert and no distress\",\"HENT: mouth without ulcers or lesions\",\"LYMPHATICS: no cervical or supraclavicular nodes\",\"RESP: lungs clear to auscultation - no rales, rhonchi or wheezes\",\"CV: regular rhythm, normal rate, no rub, no murmur\",\"EDEMA: no LE edema bilaterally\",\"ABDOMEN: soft, nondistended, nontender, bowel sounds normal\",\"MS: extremities normal - no gross deformities noted, no evidence of inflammation in joints, no muscle tenderness\",\"SKIN: no rash\"}      Data     Renal Latest Ref Rng & Units 5/30/2019 5/30/2019 8/3/2018   Na 133 - 144 mmol/L - - -   Na (external) 136 - 145 mmol/L - - 138   K 3.5 - 5.1 mmol/L 3.7 3.7 -   K (external) 3.5 - 5.2 mmol/L - - 3.9   Cl 94 - 109 mmol/L - - -   Cl (external) 98 - 109 mmol/L - - 101   CO2 20 - 32 mmol/L - - -   CO2 (external) 22 " - 31 mmol/L - - 26   BUN 7 - 30 mg/dL - - -   BUN (external) 9 - 26 mg/dL - - 18   Cr 0.55 - 1.02 mg/dL - 1.78(A) -   Cr (external) 0.55 - 1.02 mg/dL - - 1.45(H)   Glucose 70 - 100 mg/dL - 204(A) -   Glucose (external) 70 - 180 mg/dL - - 161   Ca  8.5 - 10.4 mg/dL - - -   Ca (external) 8.4 - 10.4 mg/dL - - 10.1   Mg 1.6 - 2.3 mg/dL - - -   Mg (external) 1.5 - 2.4 mg/dL - - -     Bone Health Latest Ref Rng & Units 8/3/2018 1/11/2018 5/19/2017   Phos (external) 2.3 - 4.7 mg/dL 2.8 3.3 2.9     Heme Latest Ref Rng & Units 4/18/2016 1/11/2016 7/10/2015   WBC 4.0 - 11.0 10e9/L - - -   WBC (external) 3.8 - 11.0 k/cmm 9.8 8.3 11.5(H)   Hgb 11.7 - 15.7 g/dL - - -   Hgb (external) 11.8 - 15.5 g/dL 15.0 14.7 14.7   Plt 150 - 450 10e9/L - - -   Plt (external) 140 - 450 k/cmm 234 248 256     Liver Latest Ref Rng & Units 8/3/2018 1/11/2018 5/19/2017   AP 40 - 150 U/L - - -   TBili 0.2 - 1.3 mg/dL - - -   ALT 0 - 50 U/L - - -   AST 0 - 45 U/L - - -   Tot Protein 6.8 - 8.8 g/dL - - -   Albumin 3.9 - 5.1 g/dL - - -   Albumin (external) 3.4 - 5.0 g/dL 3.5 3.4 3.5     Pancreas Latest Ref Rng & Units 5/30/2019 8/3/2018 1/11/2018   A1C 0 - <=5.6 % 7.7(A) - -   A1C (external) 4.0 - 5.6 % - 7.3(H) 6.7(H)   Lipase 20 - 250 U/L - - -     Iron studies Latest Ref Rng & Units 3/12/2009 10/17/2008 12/11/2006   Iron 35 - 180 ug/dL 118 100 21(L)   Iron sat 15 - 46 % - 41 11(L)   Ferritin 10 - 300 ng/mL 318(H) 116 167     [facility] Latest Ref Rng & Units 9/3/2008 12/8/2006 8/3/2005   CMV IgG EU/mL 118.9 114.5 -   CMV IgM <0.90 <0.90  No detectable antibody. - -   Hep B Surf NEG - - Negative

## 2019-08-29 NOTE — NURSING NOTE
"Chief Complaint   Patient presents with     RECHECK     Follwo up Kidney TX abnormal labs and pneumonia     Vital signs:  Temp: 98.8  F (37.1  C)   BP: (!) 171/76 Pulse: 68     SpO2: 97 %       Weight: 72 kg (158 lb 12.8 oz)  Estimated body mass index is 34.54 kg/m  as calculated from the following:    Height as of 10/20/08: 1.499 m (4' 11\").    Weight as of 11/29/12: 77.6 kg (171 lb).      Arlin Santana, CMA    "

## 2019-08-29 NOTE — PROGRESS NOTES
TRANSPLANT NEPHROLOGY CONSULT NOTE    Assessment & Plan   # DDKT: Increased, now ~ 1.9 with last check after recent GERALDINE episode.  Doubt acute rejection with higher prednisone doses.  Will check UPC and follow.   - Baseline Cr ~ 1.3-1.5;     - Proteinuria: Not checked recently and will check UPC.   - Date DSA Last Checked: Not Known       Latest DSA: Not checked recently due to time from transplant   - BK Viremia: Not checked recently due to time from transplant   - Kidney Tx Biopsy: No    # Immunosuppression: Sirolimus and Prednisone (60 mg daily until ~ November/December 2019).   - Changes: Yes, due to concern sirolimus may be causal or contributing to her cryptogenic organizing pneumonia.   - Will change sirolimus to mycophenolate mofetil.   - Start mycophenolate mofetil 500 mg bid. Stop Sirolimus two days after starting mycophenolate mofetil.    - May increase mycophenolate mofetil as prednisone is tapered down.     # Prophylaxis:   - PJP: None.  Would consider starting Bactrim with high dose Bactrim, but will defer to Pulmonary provider treating patient.    # Hypertension: Inadequate control; Goal BP: < 130/80   - Changes: No, management per primary nephrologist.    # Diabetes Type 2: Borderline control (HbA1c 7-9%); 7.7% at last check   - Management as per Endocrinology.    - Patient reports it as drug-caused (prednisone).     # H/o Low Vitamin B12: Appears stable.     # Mineral Bone Disorder:   - Secondary renal hyperparathyroidism; PTH level: Not checked recently; on calcitriol.  Managed by primary Nephrologist.  - Vitamin D; level: Not checked recently and will continue on cholecalciferol.  - Calcium; level: Normal     # Electrolytes:   - Potassium; level: Normal and will continue on potassium supplement.    # Cryptogenic Organized Pneumonia: On 24/7 oxygen and high-dose prednisone. Concern over how Sirolimus is affecting the patient's lungs.  Followed closely by Pulmonary.   - Will stop sirolimus.    # Skin  Cancer: New lesions: some   - Discussed sun protection and recommend regular follow up with Dermatology.    # Medical Compliance: Yes    # Transplant History:  Etiology of kidney failure: chronic pyelonephritis (reflux nephropathy)  Tx: DDKT  Transplant: 1987 (Kidney), 1984 (Kidney)  Donor Type:  - Brain Death Donor Class: Standard Criteria Donor  Significant changes in immunosuppression: : Cyclosporine/Imuran to Sirolimus due to skin cancers; on Prednisone entire time.   Significant transplant-related complications: None    Transplant Office Phone Number: [phone]    Assessment and plan was discussed with the patient and she voiced her understanding and agreement.    Return visit: Return in about 4 months (around 2019).    Reason for Consult   Nanci Van was seen in consultation at the request of Dr. Martínez Reyes for elevated creatinine and immunosuppression management.    Chief Complaint   Ms. Van is a 59 year old here for consultation.     History of Present Illness   Nanci Van is a 59 year old female with a history of ESKD secondary to  chronic pyelonephritis (reflux nephropathy), s/p LDKT 84 from her brother.  Patient developed significant acute rejection during her pregnancy and lost her allograft and went on HD in .  She then received a DDKT 1987. She has done well with this allograft with baseline creatinine ~ 1.3-1.5 and no history or acute rejection.  Her initial immunosuppression was cyclosporine, azathioprine and prednisone.  Due to recurrent skin cancer, in  patient was changed off cyclosporine and azathioprine to sirolimus and continued on prednisone.  She had done well still on this regimen until have further medical problems recently.    She was last seen at [facility] by Dr. Reyes on 18; please see that note for further details.      Since her last visit, the patient was hospitalized at [address]  from 3/21/19 - 3/29/19. She suffered hepatic shock injury and acute renal failure; please see the hospital note for more details. The patient's creatinine ferny after this hospitalization, which is the reason for her visit today.     The patient started 24/7 oxygen approximately 3 months ago, and started on oxygen at night approximately 1 year ago for cryptogenic organized pneumonia. The patient reports that she is fatigued often, and describes herself as inactive due to her breathing difficulties. The breathing issues have improved over time, but are still affecting her activity. The patient reports experiencing loose stools often. She also reports experiencing lower leg edema bilaterally often.       Today, the patient reports feeling about the same, and appears stable. She is still on the 24/7 oxygen and uses a walker to ambulate and hold her oxygen tank. She wears compression stockings to control her lower leg edema, which could be caused by the patient's lymphedema. She had no other symptoms or concerns today.     Recent Hospitalizations:  [] No [x] Yes 3/21/19 - 3/29/19; see HPI   New Medical Issues: [] No [x] Yes Cryptogenic organized pneumonia   Decreased energy: [] No [x] Yes Fatigue is usual for the patient.    Chest pain or SOB with exertion:  [x] No [] Yes    Appetite change or weight change: [x] No [] Yes    Nausea, vomiting or diarrhea:  [x] No [] Yes    Fever, sweats or chills: [x] No [] Yes Had with sepsis, but nothing currently.    Leg swelling: [] No [x] Yes 1+ on left, trace-1+ on the right     Home BP: 90/60 previously, but has gone up recently.     Review of Systems   A comprehensive review of systems was obtained and negative, except as noted in the HPI or PMH.    Problem List   Patient Active Problem List   Diagnosis     Dyslipidemia     Kidney replaced by transplant     HTN, kidney transplant related     Other osteoporosis     Esophageal reflux     Attention deficit disorder     Squamous cell  carcinoma     Aftercare following organ transplant     Immunosuppression (H)     Depression     Vitamin D deficiency     Secondary renal hyperparathyroidism (H)     Diabetes mellitus, type 2 (H)     ADD (attention deficit disorder)     Hypokalemia     Cryptogenic organizing pneumonia (H)       Past Medical History    Past Medical History:   Diagnosis Date     Abnormal glandular Papanicolaou smear of cervix      ADD (attention deficit disorder)      Anemia in chronic renal disease      Cryptogenic organizing pneumonia (H)      Diabetes mellitus, type 2 (H)      Dyslipidemia      GERD (gastroesophageal reflux disease)      HTN, kidney transplant related      Hypokalemia      Immunosuppression (H)      Kidney replaced by transplant 1987    f/b renal     Mitral valve disorders(424.0) 2005    echo done, she needs SBE prophylaxis     Other osteoporosis      Postmenopausal atrophic vaginitis     post-men.     Pyelonephritis, unspecified     had bilat nephrectomy      Secondary renal hyperparathyroidism (H)      SQUAMOUS CELL CANCER 9/06    left leg     Squamous cell carcinoma      Urosepsis 12/06    Enterococcal urosepsis, hospitalized     Vitamin D deficiency        Past Surgical History   Past Surgical History:   Procedure Laterality Date     BIOPSY OF SKIN LESION       C MAXILLARY SINUSOTOMY      surg twice      C NONSPECIFIC PROCEDURE  2001    cervical surgery x 2 for fractured C4-5     C TOTAL ABDOM HYSTERECTOMY  1996     for hemorrhaging, including BSO     C TRANSPLANT KIDNEY+RECIP NEPHREC  1983, 1987    first one from brother, rejected due to preg, 2nd transplant from cadaver     MOHS MICROGRAPHIC PROCEDURE       SURGICAL HISTORY OF -   9/06    removal squamous cell CA left leg       Family History   Family History   Problem Relation Age of Onset     Hypertension Father      Genitourinary Problems Father         kidney transplant     Kidney Disease Father      Polycystic Kidney Diease Father      Skin Cancer Father   "    Breast Cancer Mother      Kidney Disease Son        Social History   Social History     Tobacco Use     Smoking status: Never Smoker     Smokeless tobacco: Never Used   Substance Use Topics     Alcohol use: Not Currently     Comment: socially     Drug use: No       Allergies   Allergies   Allergen Reactions     Codeine      Erythromycin      \"violently ill\"     Esters      tigan     Fosamax      GI     Levaquin [Levofloxacin]        Medications   Current Outpatient Medications   Medication Sig     mycophenolate (GENERIC EQUIVALENT) 250 MG capsule Take 2 capsules (500 mg) by mouth 2 times daily     ammonium lactate (LAC-HYDRIN) 12 % lotion Apply topically 2 times daily     Amphetamine-Dextroamphetamine (ADDERALL PO) Take 40 mg by mouth daily     ARIPiprazole (ABILIFY) 5 MG tablet Take 5 mg by mouth     B COMPLEX-C-FOLIC ACID PO      calcipotriene 0.005 % OINT Apply with equal parts 5-fluorouracil cream twice daily to the arms and hands until a reaction develops (minimum of four days).     calcitRIOL (ROCALTROL) 0.25 MCG capsule Take 0.25 mcg by mouth daily     Cholecalciferol (VITAMIN D PO)      fluorouracil (EFUDEX) 5 % cream Apply with equal parts calcipotriene ointment twice daily to the arms and hands until a reaction develops (minimum of four days).     FLUoxetine HCl (PROZAC PO) Take 40 mg by mouth     fluticasone-vilanterol (BREO ELLIPTA) 200-25 MCG/INH oral inhaler Inhale 1 puff into the lungs daily     Furosemide (LASIX) 20 MG tablet Take 20 mg by mouth daily Take 20 mg twice daily     HYDROcodone-acetaminophen (NORCO) 5-325 MG per tablet Take 1 tablet by mouth every 4 hours as needed for moderate to severe pain     HYDROcodone-acetaminophen (NORCO) 5-325 MG per tablet Take 1 tablet by mouth every 6 hours as needed for pain     HydrOXYzine Pamoate (VISTARIL PO)      insulin detemir (LEVEMIR) 100 UNIT/ML soln Inject Subcutaneous At Bedtime     Ipratropium-Albuterol (COMBIVENT RESPIMAT)  MCG/ACT " inhaler Inhale 1 puff into the lungs 4 times daily     mupirocin (BACTROBAN) 2 % ointment Use 1 to 2 times a day to affected area.     niacinamide 500 MG tablet Take 1 tablet (500 mg) by mouth 2 times daily (with meals)     potassium 75 MG TABS      PREDNISONE 5 MG OR TABS 1 TABLET DAILY     propranolol (INDERAL) 10 MG tablet TAKE 1 TABLET BY MOUTH TWICE A DAY FOR HAND TREMOR     PROTONIX 40 MG OR TBEC 1 TABLET DAILY     SIMVASTATIN PO      TEMAZEPAM PO 15 mg.      TRAZODONE HCL 50 MG OR TABS 1-2 pills at night as needed     Venlafaxine HCl (EFFEXOR XR PO) Take  by mouth. 300 mg     No current facility-administered medications for this visit.      Medications Discontinued During This Encounter   Medication Reason     RAPAMUNE 1 MG tablet        Physical Exam   Vital Signs: BP (!) 171/76   Pulse 68   Temp 98.8  F (37.1  C)   Wt 72 kg (158 lb 12.8 oz)   SpO2 97%     GENERAL APPEARANCE: alert and no distress  HENT: mouth without ulcers or lesions  LYMPHATICS: no cervical or supraclavicular nodes  RESP: lungs clear to auscultation - no rales, rhonchi or wheezes  CV: regular rhythm, normal rate, no rub, no murmur  EDEMA: trace to 1+ LE edema bilaterally  ABDOMEN: soft, nondistended, tenderness in left lower quadrant, actin keratosis under skin, bowel sounds normal  MS: extremities normal - no gross deformities noted, no evidence of inflammation in joints, no muscle tenderness  SKIN: no rash  TX KIDNEY: normal  DIALYSIS ACCESS:  None      Data     Renal Latest Ref Rng & Units 5/30/2019 5/30/2019 8/3/2018   Na 133 - 144 mmol/L - - -   Na (external) 136 - 145 mmol/L - - 138   K 3.5 - 5.1 mmol/L 3.7 3.7 -   K (external) 3.5 - 5.2 mmol/L - - 3.9   Cl 94 - 109 mmol/L - - -   Cl (external) 98 - 109 mmol/L - - 101   CO2 20 - 32 mmol/L - - -   CO2 (external) 22 - 31 mmol/L - - 26   BUN 7 - 30 mg/dL - - -   BUN (external) 9 - 26 mg/dL - - 18   Cr 0.55 - 1.02 mg/dL - 1.78(A) -   Cr (external) 0.55 - 1.02 mg/dL - - 1.45(H)    Glucose 70 - 100 mg/dL - 204(A) -   Glucose (external) 70 - 180 mg/dL - - 161   Ca  8.5 - 10.4 mg/dL - - -   Ca (external) 8.4 - 10.4 mg/dL - - 10.1   Mg 1.6 - 2.3 mg/dL - - -   Mg (external) 1.5 - 2.4 mg/dL - - -     Bone Health Latest Ref Rng & Units 8/3/2018 1/11/2018 5/19/2017   Phos (external) 2.3 - 4.7 mg/dL 2.8 3.3 2.9     Heme Latest Ref Rng & Units 4/18/2016 1/11/2016 7/10/2015   WBC 4.0 - 11.0 10e9/L - - -   WBC (external) 3.8 - 11.0 k/cmm 9.8 8.3 11.5(H)   Hgb 11.7 - 15.7 g/dL - - -   Hgb (external) 11.8 - 15.5 g/dL 15.0 14.7 14.7   Plt 150 - 450 10e9/L - - -   Plt (external) 140 - 450 k/cmm 234 248 256     Liver Latest Ref Rng & Units 8/3/2018 1/11/2018 5/19/2017   AP 40 - 150 U/L - - -   TBili 0.2 - 1.3 mg/dL - - -   ALT 0 - 50 U/L - - -   AST 0 - 45 U/L - - -   Tot Protein 6.8 - 8.8 g/dL - - -   Albumin 3.9 - 5.1 g/dL - - -   Albumin (external) 3.4 - 5.0 g/dL 3.5 3.4 3.5     Pancreas Latest Ref Rng & Units 5/30/2019 8/3/2018 1/11/2018   A1C 0 - <=5.6 % 7.7(A) - -   A1C (external) 4.0 - 5.6 % - 7.3(H) 6.7(H)   Lipase 20 - 250 U/L - - -     Iron studies Latest Ref Rng & Units 3/12/2009 10/17/2008 12/11/2006   Iron 35 - 180 ug/dL 118 100 21(L)   Iron sat 15 - 46 % - 41 11(L)   Ferritin 10 - 300 ng/mL 318(H) 116 167     [facility] T[facility] Virology Latest Ref Rng & Units 9/3/2008 12/8/2006 8/3/2005   CMV IgG EU/mL 118.9 114.5 -   CMV IgM <0.90 <0.90  No detectable antibody. - -   Hep B Surf NEG - - Negative     Scribe Disclosure:  I, Vashti Cornell, am serving as a scribe to document services personally performed by Martínez Robertson M.D. at this visit, based upon the provider's statements to me. All documentation has been reviewed by the aforementioned provider prior to being entered into the official medical record.

## 2019-08-29 NOTE — LETTER
2019      RE: Nanci JOSHUA [place]  [address]  [facility] [address]       TRANSPLANT NEPHROLOGY CONSULT NOTE    Assessment & Plan   # DDKT: Increased   - Baseline Cr ~ 1.3-1.5; 1.9 with last check    - Proteinuria: Not checked recently   - Date DSA Last Checked: Not Known       Latest DSA: Not checked recently due to time from transplant   - BK Viremia: Not checked recently due to time from transplant   - Kidney Tx Biopsy: No    Will do labs every other week for the next few draws.     # Immunosuppression: Sirolimus and Prednisone (60 mg daily until ~ 2019).   - Changes: Yes   - Switch from Sirolimus to Mycophenolate mofetil.   - Start Mycophenolate mofetil (500 mg BID). Stop Sirolimus two days after starting MMF.    - May increase MMF as Prednisone is tapered down.     # Prophylaxis:   - PJP: None    # Hypertension: Inadequate control; Goal BP: < 130/80   - Changes: No, management per primary nephrologist.     # Diabetes Type 2: Borderline control (HbA1c 7-9%); 7.7% at last check   - Management as per Endocrinology.    - Patient reports it as drug-caused (Prednisone).     # h/o Low Vitamin B12: Appears stable.     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 Concern over how Sirolimus is affecting the patient's lungs.     # Skin Cancer: New lesions: some   - Discussed sun protection and recommend regular follow up with Dermatology.    # Medical Compliance: Yes    # Transplant History:  Etiology of kidney failure: chronic pyelonephritis (reflux nephropathy)  Tx: DDKT  Transplant: 1987 (Kidney), 1984 (Kidney)  Donor Type:  - Brain Death Donor Class: Standard Criteria Donor  Significant changes in  immunosuppression: 2011: Cyclosporine/Imuran to Sirolimus due to skin cancers; on Prednisone entire time.   Significant transplant-related complications: None    Transplant Office Phone Number: [phone]    Assessment and plan was discussed with the patient and she voiced her understanding and agreement.    Return visit: Return in about 4 months (around 12/29/2019).    Reason for Consult   Nanci Van was seen in consultation at the request of Dr. Martínez Reyes for elevated creatinine.    Chief Complaint   Ms. Vna is a 59 year old here for consultation.     History of Present Illness   Nanci Van is a 59 year old female with a history of ESKD secondary to  chronic pyelonephritis (reflux nephropathy) status post DDKT  completed on 11/2/1987 who presents for consultation. She was last seen at [facility] by Dr. Reyes on 1/11/18; please see that note for further details.      Since her last visit, the patient was hospitalized at [address] from 3/21/19 - 3/29/19. She suffered hepatic shock injury and acute renal failure; please see the hospital note for more details. The patient's creatinine ferny after this hospitalization, which is the reason for her visit today.     The patient started 24/7 oxygen approximately 3 months ago, and started on oxygen at night approximately 1 year ago for cryptogenic organized pneumonia. The patient reports that she is fatigued often, and describes herself as inactive due to her breathing difficulties. The breathing issues have improved over time, but are still affecting her activity. The patient reports experiencing loose stools often. She also reports experiencing lower leg edema bilaterally often.       Today, the patient reports feeling about the same, and appears stable. She is still on the 24/7 oxygen and uses a walker to ambulate and hold her oxygen tank. She wears compression stockings to control her lower leg edema, which could be  caused by the patient's lymphedema. She had no other symptoms or concerns today.     Recent Hospitalizations:  [] No [x] Yes 3/21/19 - 3/29/19; see HPI   New Medical Issues: [] No [x] Yes Cryptogenic organized pneumonia   Decreased energy: [] No [x] Yes Fatigue is usual for the patient.    Chest pain or SOB with exertion:  [x] No [] Yes    Appetite change or weight change: [x] No [] Yes    Nausea, vomiting or diarrhea:  [x] No [] Yes    Fever, sweats or chills: [x] No [] Yes Had with sepsis, but nothing currently.    Leg swelling: [] No [x] Yes 1+ on left, trace-1+ on the right     Home BP: 90/60 previously, but has gone up recently.     Review of Systems   A comprehensive review of systems was obtained and negative, except as noted in the HPI or PMH.    Problem List   Patient Active Problem List   Diagnosis     Mixed hyperlipidemia     Kidney replaced by transplant     Essential hypertension     Other osteoporosis     Disorder of kidney and ureter     Esophageal reflux     Attention deficit disorder     Squamous cell carcinoma       Past Medical History    Past Medical History:   Diagnosis Date     Abnormal glandular Papanicolaou smear of cervix      Esophageal reflux      Essential hypertension, benign      Kidney replaced by transplant 1987    f/b renal     Mitral valve disorders(424.0) 2005    echo done, she needs SBE prophylaxis     Mixed hyperlipidemia      Other osteoporosis      Postmenopausal atrophic vaginitis     post-men.     Pyelonephritis, unspecified     had bilat nephrectomy      SQUAMOUS CELL CANCER 9/06    left leg     Squamous cell carcinoma      Urosepsis 12/06    Enterococcal urosepsis, hospitalized       Past Surgical History   Past Surgical History:   Procedure Laterality Date     BIOPSY OF SKIN LESION       C MAXILLARY SINUSOTOMY      surg twice      C NONSPECIFIC PROCEDURE  2001    cervical surgery x 2 for fractured C4-5     C TOTAL ABDOM HYSTERECTOMY  1996     for hemorrhaging, including  "BSO     C TRANSPLANT KIDNEY+RECIP NEPHREC  1983, 1987    first one from brother, rejected due to preg, 2nd transplant from cadaver     MOHS MICROGRAPHIC PROCEDURE       SURGICAL HISTORY OF -   9/06    removal squamous cell CA left leg       Family History   Family History   Problem Relation Age of Onset     Hypertension Father      Genitourinary Problems Father         kidney transplant     Kidney Disease Father      Polycystic Kidney Diease Father      Skin Cancer Father      Breast Cancer Mother      Kidney Disease Son        Social History   Social History     Tobacco Use     Smoking status: Never Smoker     Smokeless tobacco: Never Used   Substance Use Topics     Alcohol use: Not Currently     Comment: socially     Drug use: No       Allergies   Allergies   Allergen Reactions     Codeine      Erythromycin      \"violently ill\"     Esters      tigan     Fosamax      GI     Levaquin [Levofloxacin]        Medications   Current Outpatient Medications   Medication Sig     mycophenolate (GENERIC EQUIVALENT) 250 MG capsule Take 2 capsules (500 mg) by mouth 2 times daily     ammonium lactate (LAC-HYDRIN) 12 % lotion Apply topically 2 times daily     Amphetamine-Dextroamphetamine (ADDERALL PO) Take 40 mg by mouth daily     ARIPiprazole (ABILIFY) 5 MG tablet Take 5 mg by mouth     B COMPLEX-C-FOLIC ACID PO      calcipotriene 0.005 % OINT Apply with equal parts 5-fluorouracil cream twice daily to the arms and hands until a reaction develops (minimum of four days).     calcitRIOL (ROCALTROL) 0.25 MCG capsule Take 0.25 mcg by mouth daily     Cholecalciferol (VITAMIN D PO)      fluorouracil (EFUDEX) 5 % cream Apply with equal parts calcipotriene ointment twice daily to the arms and hands until a reaction develops (minimum of four days).     FLUoxetine HCl (PROZAC PO) Take 40 mg by mouth     fluticasone-vilanterol (BREO ELLIPTA) 200-25 MCG/INH oral inhaler Inhale 1 puff into the lungs daily     Furosemide (LASIX) 20 MG tablet " Take 20 mg by mouth daily Take 20 mg twice daily     HYDROcodone-acetaminophen (NORCO) 5-325 MG per tablet Take 1 tablet by mouth every 4 hours as needed for moderate to severe pain     HYDROcodone-acetaminophen (NORCO) 5-325 MG per tablet Take 1 tablet by mouth every 6 hours as needed for pain     HydrOXYzine Pamoate (VISTARIL PO)      insulin detemir (LEVEMIR) 100 UNIT/ML soln Inject Subcutaneous At Bedtime     Ipratropium-Albuterol (COMBIVENT RESPIMAT)  MCG/ACT inhaler Inhale 1 puff into the lungs 4 times daily     mupirocin (BACTROBAN) 2 % ointment Use 1 to 2 times a day to affected area.     niacinamide 500 MG tablet Take 1 tablet (500 mg) by mouth 2 times daily (with meals)     potassium 75 MG TABS      PREDNISONE 5 MG OR TABS 1 TABLET DAILY     propranolol (INDERAL) 10 MG tablet TAKE 1 TABLET BY MOUTH TWICE A DAY FOR HAND TREMOR     PROTONIX 40 MG OR TBEC 1 TABLET DAILY     SIMVASTATIN PO      TEMAZEPAM PO 15 mg.      TRAZODONE HCL 50 MG OR TABS 1-2 pills at night as needed     Venlafaxine HCl (EFFEXOR XR PO) Take  by mouth. 300 mg     No current facility-administered medications for this visit.      Medications Discontinued During This Encounter   Medication Reason     RAPAMUNE 1 MG tablet        Physical Exam   Vital Signs: BP (!) 171/76   Pulse 68   Temp 98.8  F (37.1  C)   Wt 72 kg (158 lb 12.8 oz)   SpO2 97%     GENERAL APPEARANCE: alert and no distress  HENT: mouth without ulcers or lesions  LYMPHATICS: no cervical or supraclavicular nodes  RESP: lungs clear to auscultation - no rales, rhonchi or wheezes  CV: regular rhythm, normal rate, no rub, no murmur  EDEMA: trace to 1+ LE edema bilaterally  ABDOMEN: soft, nondistended, tenderness in left lower quadrant, actin keratosis under skin, bowel sounds normal  MS: extremities normal - no gross deformities noted, no evidence of inflammation in joints, no muscle tenderness  SKIN: no rash  TX KIDNEY: normal  DIALYSIS ACCESS:  None      Data      Renal Latest Ref Rng & Units 5/30/2019 5/30/2019 8/3/2018   Na 133 - 144 mmol/L - - -   Na (external) 136 - 145 mmol/L - - 138   K 3.5 - 5.1 mmol/L 3.7 3.7 -   K (external) 3.5 - 5.2 mmol/L - - 3.9   Cl 94 - 109 mmol/L - - -   Cl (external) 98 - 109 mmol/L - - 101   CO2 20 - 32 mmol/L - - -   CO2 (external) 22 - 31 mmol/L - - 26   BUN 7 - 30 mg/dL - - -   BUN (external) 9 - 26 mg/dL - - 18   Cr 0.55 - 1.02 mg/dL - 1.78(A) -   Cr (external) 0.55 - 1.02 mg/dL - - 1.45(H)   Glucose 70 - 100 mg/dL - 204(A) -   Glucose (external) 70 - 180 mg/dL - - 161   Ca  8.5 - 10.4 mg/dL - - -   Ca (external) 8.4 - 10.4 mg/dL - - 10.1   Mg 1.6 - 2.3 mg/dL - - -   Mg (external) 1.5 - 2.4 mg/dL - - -     Bone Health Latest Ref Rng & Units 8/3/2018 1/11/2018 5/19/2017   Phos (external) 2.3 - 4.7 mg/dL 2.8 3.3 2.9     Heme Latest Ref Rng & Units 4/18/2016 1/11/2016 7/10/2015   WBC 4.0 - 11.0 10e9/L - - -   WBC (external) 3.8 - 11.0 k/cmm 9.8 8.3 11.5(H)   Hgb 11.7 - 15.7 g/dL - - -   Hgb (external) 11.8 - 15.5 g/dL 15.0 14.7 14.7   Plt 150 - 450 10e9/L - - -   Plt (external) 140 - 450 k/cmm 234 248 256     Liver Latest Ref Rng & Units 8/3/2018 1/11/2018 5/19/2017   AP 40 - 150 U/L - - -   TBili 0.2 - 1.3 mg/dL - - -   ALT 0 - 50 U/L - - -   AST 0 - 45 U/L - - -   Tot Protein 6.8 - 8.8 g/dL - - -   Albumin 3.9 - 5.1 g/dL - - -   Albumin (external) 3.4 - 5.0 g/dL 3.5 3.4 3.5     Pancreas Latest Ref Rng & Units 5/30/2019 8/3/2018 1/11/2018   A1C 0 - <=5.6 % 7.7(A) - -   A1C (external) 4.0 - 5.6 % - 7.3(H) 6.7(H)   Lipase 20 - 250 U/L - - -     Iron studies Latest Ref Rng & Units 3/12/2009 10/17/2008 12/11/2006   Iron 35 - 180 ug/dL 118 100 21(L)   Iron sat 15 - 46 % - 41 11(L)   Ferritin 10 - 300 ng/mL 318(H) 116 167     [facility] Txp Virology Latest Ref Rng & Units 9/3/2008 12/8/2006 8/3/2005   CMV IgG EU/mL 118.9 114.5 -   CMV IgM <0.90 <0.90  No detectable antibody. - -   Hep B Surf NEG - - Negative     Scribe Disclosure:  Vashti MALCOLM  Kraig, am serving as a scribe to document services personally performed by Martínez Robertson M.D. at this visit, based upon the provider's statements to me. All documentation has been reviewed by the aforementioned provider prior to being entered into the official medical record.     Kidney/Pancreas Recipient

## 2019-08-29 NOTE — LETTER
PHYSICIAN ORDERS      DATE & TIME ISSUED: 2019 4:33 PM  PATIENT NAME: Nanci Van   : 1960     [facility] MR# [if applicable]: [id number]     DIAGNOSIS:  Kidney transplant   ICD-10 CODE: Z94.0      Please complete the following labs in one week  BMP  CBC   Mycophenolate level (ensure 12 hours between last dose and blood draw)    Any questions please call: [phone] option 5    Please fax results to [phone].

## 2019-08-29 NOTE — LETTER
[facility]  Room 2[address]200  [facility],  [address]  [address]  Tel [phone]  Toll Free [phone]                OUTPATIENT LABORATORY TEST ORDER    Patient Name: Nanci Van  Transplant Date: 11/2/1987 (Kidney), 7/26/1984 (Kidney)  YOB: 1960  Issue Date & Time:August 29, 20194:39 PM    [facility] MR:  [id number]  Exp. Date (1 year after date issued)      Diagnoses: Kidney Transplant (ICD-10 Z94.0)   Long term use of medications (ICD-10 Z79.899)     Lab results to be available on the same day drawn.   Patient should release information to the [facility], [facility].  Please fax to the Transplant Center at [phone].    Every other week   ?Hemogram and Platelet   ?Basic Metabolic Panel (Sodium, Potassium, Chloride, CO2, Creatinine, Urea Nitrogen, Glucose, Calcium)                   Every 6 Months                                    ?Urine for protein/creatinine      If you have any questions, please call [facility] at [phone] or [phone].    Please fax labs to [phone]

## 2019-09-20 NOTE — TELEPHONE ENCOUNTER
First issue:    Nanci Van missed mycophenolate dose this morning - she will not be home from work until 5pm this evening. She wanted to take Rapamune as she had it with her.    Second Issue:    Nanci reported her systolic BP is > 200 this morning.  She currently does not have anyone managing BP.     Plan:    RNCC instructed her not to take rapamune and to take mycophenolate as soon as she got home. And then resume IS schedule tomorrow morning.    RNCC instructed her to go to ED for evaluation of hypertension. Nanci Van v/u of risk of severe hypertension but did want to go to ED today. Ultimately, RNCC instructed her to contact PCP or Dr. Reyes's office for instructions or go to ED today. Nanci TRES Van v/u of risks and recommendations.     She is planning to complete labs on Monday after recent IS change.

## 2019-09-22 PROBLEM — Z48.298 AFTERCARE FOLLOWING ORGAN TRANSPLANT: Status: ACTIVE | Noted: 2019-01-01

## 2019-09-22 PROBLEM — F32.A DEPRESSION: Status: ACTIVE | Noted: 2019-01-01

## 2019-09-22 PROBLEM — D84.9 IMMUNOSUPPRESSION (H): Status: ACTIVE | Noted: 2019-01-01

## 2019-09-22 PROBLEM — E55.9 VITAMIN D DEFICIENCY: Status: ACTIVE | Noted: 2019-01-01

## 2019-12-18 NOTE — TELEPHONE ENCOUNTER
Prior Authorization Not Needed per Insurance    Medication: Mycophenolate 250mg - PA NOT NEEDED/PART B COVERED  Insurance Company:    Expected CoPay:      Pharmacy Filling the Rx:    Pharmacy Notified:    Patient Notified:      Prior authorization is not required due to coverage is available under Medicare Part-B benefits.   According to the  Pharmacy records, patient had previous ESRD Part B entitlement. Original effective date 11/2/1987 through 11/2/1990. Then reactivated 11/1/2014 to present.    Notified [facility], they will look into this and work on it.

## 2020-03-22 ENCOUNTER — HEALTH MAINTENANCE LETTER (OUTPATIENT)
Age: 60
End: 2020-03-22

## 2020-12-23 ENCOUNTER — POST MORTEM DOCUMENTATION (OUTPATIENT)
Dept: TRANSPLANT | Facility: CLINIC | Age: 60
End: 2020-12-23

## 2020-12-23 NOTE — PROGRESS NOTES
Received notification of patient's death from [facility] [employer] [facility] routine followup.  Place of death was reported as Local hospital.  Graft status at the time of death was reported as Functioning.  Additional information: Patient requested comfort cares / pain relief and discontinue all aggressive support.  See Care Everywhere.  TIS verification is: Complete

## 2021-08-03 NOTE — TELEPHONE ENCOUNTER
Prior Authorization Specialty Medication Request    Medication/Dose:  Mycophenolate Mofetil   ICD code (if different than what is on RX):  Z94.0   Previously Tried and Failed:      Important Lab Values:   Rationale:     Cover My Med  Key: [id number]    Pharmacy Information (if different than what is on RX)  Name:  [facility]  Phone:  [phone]  Fax: [phone]             Yes

## 2022-01-10 NOTE — PROGRESS NOTES
Called patient and was able to schedule in 15 min apt for back pain referral.    SUBJECTIVE:  56F returning for 2 week follow-up, reporting redness and pain at surgery site for SCC 2 weeks ago.     OBJECTIVE:  A healthy lady in no distress.  Present on her left lower leg is a red-rimmed ~5cm linear wound with friable edges, intact running and interrupted prolene sutures, mild tenderness, minimal inducible purlence.     MEDICATIONS:  Reviewed.      ALLERGIES:  Reviewed.     ASSESSMENT:  Postoperative woudn infection     PLAN:   -empiric RX cephalexin 500 mg po BID, which she has tolerated well in the past. Side effects reviewed.  -swab for bacterial culture will call with results, plan for abx transition    RTC 1 weeks (nurse visit for suture removal)    Discussed and examined with [facility] staff dermatologist Dr. JAMES Caceres MD, JACINTA  PGY-4, Dermatology

## 2024-06-13 NOTE — PROGRESS NOTES
I talked with and examined Nanci Van and I agree with the assessment and the plan. I was present for key portions of the procedure. ROSINA Cantu MD.     2 2 4 1 2 2 2